# Patient Record
Sex: MALE | Race: WHITE | NOT HISPANIC OR LATINO | Employment: FULL TIME | URBAN - METROPOLITAN AREA
[De-identification: names, ages, dates, MRNs, and addresses within clinical notes are randomized per-mention and may not be internally consistent; named-entity substitution may affect disease eponyms.]

---

## 2017-05-16 ENCOUNTER — RECORDS - HEALTHEAST (OUTPATIENT)
Dept: LAB | Facility: CLINIC | Age: 32
End: 2017-05-16

## 2017-05-17 LAB
CHOLEST SERPL-MCNC: 221 MG/DL
FASTING STATUS PATIENT QL REPORTED: ABNORMAL
HDLC SERPL-MCNC: 32 MG/DL
LDLC SERPL CALC-MCNC: 165 MG/DL
TRIGL SERPL-MCNC: 121 MG/DL

## 2017-05-18 LAB — ANA SER QL: 0.1 U

## 2018-09-15 ENCOUNTER — RECORDS - HEALTHEAST (OUTPATIENT)
Dept: ADMINISTRATIVE | Facility: OTHER | Age: 33
End: 2018-09-15

## 2018-09-19 ENCOUNTER — OFFICE VISIT - HEALTHEAST (OUTPATIENT)
Dept: SURGERY | Facility: CLINIC | Age: 33
End: 2018-09-19

## 2018-09-19 DIAGNOSIS — K81.9 CHOLECYSTITIS: ICD-10-CM

## 2018-09-19 ASSESSMENT — MIFFLIN-ST. JEOR: SCORE: 1695.1

## 2018-09-21 ENCOUNTER — COMMUNICATION - HEALTHEAST (OUTPATIENT)
Dept: SURGERY | Facility: CLINIC | Age: 33
End: 2018-09-21

## 2018-09-26 ASSESSMENT — MIFFLIN-ST. JEOR: SCORE: 1695.1

## 2018-09-27 ENCOUNTER — RECORDS - HEALTHEAST (OUTPATIENT)
Dept: ADMINISTRATIVE | Facility: OTHER | Age: 33
End: 2018-09-27

## 2018-10-03 ENCOUNTER — ANESTHESIA - HEALTHEAST (OUTPATIENT)
Dept: SURGERY | Facility: AMBULATORY SURGERY CENTER | Age: 33
End: 2018-10-03

## 2018-10-04 ENCOUNTER — SURGERY - HEALTHEAST (OUTPATIENT)
Dept: SURGERY | Facility: AMBULATORY SURGERY CENTER | Age: 33
End: 2018-10-04

## 2018-10-04 ASSESSMENT — MIFFLIN-ST. JEOR: SCORE: 1695.1

## 2018-10-06 ENCOUNTER — AMBULATORY - HEALTHEAST (OUTPATIENT)
Dept: SURGERY | Facility: CLINIC | Age: 33
End: 2018-10-06

## 2018-10-06 DIAGNOSIS — K81.9 CHOLECYSTITIS: ICD-10-CM

## 2018-10-15 ENCOUNTER — COMMUNICATION - HEALTHEAST (OUTPATIENT)
Dept: SURGERY | Facility: CLINIC | Age: 33
End: 2018-10-15

## 2018-10-18 ENCOUNTER — OFFICE VISIT - HEALTHEAST (OUTPATIENT)
Dept: SURGERY | Facility: CLINIC | Age: 33
End: 2018-10-18

## 2018-10-18 DIAGNOSIS — Z48.89 POSTOPERATIVE VISIT: ICD-10-CM

## 2018-10-18 ASSESSMENT — MIFFLIN-ST. JEOR: SCORE: 1695.1

## 2018-11-01 ENCOUNTER — COMMUNICATION - HEALTHEAST (OUTPATIENT)
Dept: SURGERY | Facility: CLINIC | Age: 33
End: 2018-11-01

## 2019-01-08 ENCOUNTER — RECORDS - HEALTHEAST (OUTPATIENT)
Dept: ADMINISTRATIVE | Facility: OTHER | Age: 34
End: 2019-01-08

## 2020-05-22 ENCOUNTER — RECORDS - HEALTHEAST (OUTPATIENT)
Dept: ADMINISTRATIVE | Facility: OTHER | Age: 35
End: 2020-05-22

## 2020-05-26 ENCOUNTER — COMMUNICATION - HEALTHEAST (OUTPATIENT)
Dept: ADMINISTRATIVE | Facility: HOSPITAL | Age: 35
End: 2020-05-26

## 2020-05-29 ENCOUNTER — RECORDS - HEALTHEAST (OUTPATIENT)
Dept: ADMINISTRATIVE | Facility: OTHER | Age: 35
End: 2020-05-29

## 2020-06-09 ENCOUNTER — COMMUNICATION - HEALTHEAST (OUTPATIENT)
Dept: ADMINISTRATIVE | Facility: HOSPITAL | Age: 35
End: 2020-06-09

## 2020-06-23 ENCOUNTER — OFFICE VISIT - HEALTHEAST (OUTPATIENT)
Dept: ONCOLOGY | Facility: HOSPITAL | Age: 35
End: 2020-06-23

## 2020-06-23 DIAGNOSIS — I26.99 ACUTE PULMONARY EMBOLISM WITHOUT ACUTE COR PULMONALE, UNSPECIFIED PULMONARY EMBOLISM TYPE (H): ICD-10-CM

## 2020-06-29 ENCOUNTER — COMMUNICATION - HEALTHEAST (OUTPATIENT)
Dept: ONCOLOGY | Facility: HOSPITAL | Age: 35
End: 2020-06-29

## 2020-07-01 ENCOUNTER — COMMUNICATION - HEALTHEAST (OUTPATIENT)
Dept: INFUSION THERAPY | Facility: HOSPITAL | Age: 35
End: 2020-07-01

## 2020-07-09 ENCOUNTER — AMBULATORY - HEALTHEAST (OUTPATIENT)
Dept: INFUSION THERAPY | Facility: HOSPITAL | Age: 35
End: 2020-07-09

## 2020-07-09 DIAGNOSIS — I26.99 ACUTE PULMONARY EMBOLISM WITHOUT ACUTE COR PULMONALE, UNSPECIFIED PULMONARY EMBOLISM TYPE (H): ICD-10-CM

## 2020-07-10 LAB
AT III ACT/NOR PPP CHRO: 108 % (ref 85–135)
CARDIOLIPIN IGG SER IA-ACNC: <1.6 GPL-U/ML (ref 0–19.9)
CARDIOLIPIN IGM SER IA-ACNC: 0.5 MPL-U/ML (ref 0–19.9)
PROT C ACT/NOR PPP CHRO: 155 % (ref 70–170)
PROT S FREE AG ACT/NOR PPP IA: 130 % (ref 70–148)

## 2020-07-13 LAB
B2 GLYCOPROT1 IGG SERPL IA-ACNC: 1.3 U/ML
B2 GLYCOPROT1 IGM SERPL IA-ACNC: <2.9 U/ML
FACTOR 5 LEIDEN AND FACTOR 2 PROTHROMBIN MUTATION: NORMAL

## 2020-07-14 LAB — LA PPP-IMP: POSITIVE

## 2020-07-30 ENCOUNTER — AMBULATORY - HEALTHEAST (OUTPATIENT)
Dept: ONCOLOGY | Facility: HOSPITAL | Age: 35
End: 2020-07-30

## 2020-07-30 DIAGNOSIS — I26.99 PULMONARY EMBOLISM ON RIGHT (H): ICD-10-CM

## 2020-07-31 ENCOUNTER — COMMUNICATION - HEALTHEAST (OUTPATIENT)
Dept: ONCOLOGY | Facility: HOSPITAL | Age: 35
End: 2020-07-31

## 2020-08-04 ENCOUNTER — COMMUNICATION - HEALTHEAST (OUTPATIENT)
Dept: ONCOLOGY | Facility: HOSPITAL | Age: 35
End: 2020-08-04

## 2020-11-23 ENCOUNTER — AMBULATORY - HEALTHEAST (OUTPATIENT)
Dept: INFUSION THERAPY | Facility: HOSPITAL | Age: 35
End: 2020-11-23

## 2020-11-23 DIAGNOSIS — I26.99 PULMONARY EMBOLISM ON RIGHT (H): ICD-10-CM

## 2020-11-23 DIAGNOSIS — I26.99 ACUTE PULMONARY EMBOLISM WITHOUT ACUTE COR PULMONALE, UNSPECIFIED PULMONARY EMBOLISM TYPE (H): ICD-10-CM

## 2020-11-23 LAB
ALBUMIN SERPL-MCNC: 3.7 G/DL (ref 3.5–5)
ALP SERPL-CCNC: 107 U/L (ref 45–120)
ALT SERPL W P-5'-P-CCNC: 96 U/L (ref 0–45)
ANION GAP SERPL CALCULATED.3IONS-SCNC: 9 MMOL/L (ref 5–18)
AST SERPL W P-5'-P-CCNC: 36 U/L (ref 0–40)
BASOPHILS # BLD AUTO: 0.1 THOU/UL (ref 0–0.2)
BASOPHILS NFR BLD AUTO: 1 % (ref 0–2)
BILIRUB SERPL-MCNC: 0.2 MG/DL (ref 0–1)
BUN SERPL-MCNC: 13 MG/DL (ref 8–22)
CALCIUM SERPL-MCNC: 9 MG/DL (ref 8.5–10.5)
CHLORIDE BLD-SCNC: 99 MMOL/L (ref 98–107)
CO2 SERPL-SCNC: 32 MMOL/L (ref 22–31)
CREAT SERPL-MCNC: 0.8 MG/DL (ref 0.7–1.3)
EOSINOPHIL # BLD AUTO: 0.2 THOU/UL (ref 0–0.4)
EOSINOPHIL NFR BLD AUTO: 2 % (ref 0–6)
ERYTHROCYTE [DISTWIDTH] IN BLOOD BY AUTOMATED COUNT: 12.9 % (ref 11–14.5)
GFR SERPL CREATININE-BSD FRML MDRD: >60 ML/MIN/1.73M2
GLUCOSE BLD-MCNC: 109 MG/DL (ref 70–125)
HCT VFR BLD AUTO: 47.6 % (ref 40–54)
HGB BLD-MCNC: 15 G/DL (ref 14–18)
IMM GRANULOCYTES # BLD: 0.1 THOU/UL
IMM GRANULOCYTES NFR BLD: 1 %
LYMPHOCYTES # BLD AUTO: 2.4 THOU/UL (ref 0.8–4.4)
LYMPHOCYTES NFR BLD AUTO: 31 % (ref 20–40)
MCH RBC QN AUTO: 30.1 PG (ref 27–34)
MCHC RBC AUTO-ENTMCNC: 31.5 G/DL (ref 32–36)
MCV RBC AUTO: 96 FL (ref 80–100)
MONOCYTES # BLD AUTO: 0.6 THOU/UL (ref 0–0.9)
MONOCYTES NFR BLD AUTO: 8 % (ref 2–10)
NEUTROPHILS # BLD AUTO: 4.7 THOU/UL (ref 2–7.7)
NEUTROPHILS NFR BLD AUTO: 59 % (ref 50–70)
PLATELET # BLD AUTO: 237 THOU/UL (ref 140–440)
PMV BLD AUTO: 10.6 FL (ref 8.5–12.5)
POTASSIUM BLD-SCNC: 5 MMOL/L (ref 3.5–5)
PROT SERPL-MCNC: 7.8 G/DL (ref 6–8)
RBC # BLD AUTO: 4.98 MILL/UL (ref 4.4–6.2)
SODIUM SERPL-SCNC: 140 MMOL/L (ref 136–145)
WBC: 7.9 THOU/UL (ref 4–11)

## 2020-11-24 ENCOUNTER — OFFICE VISIT - HEALTHEAST (OUTPATIENT)
Dept: ONCOLOGY | Facility: HOSPITAL | Age: 35
End: 2020-11-24

## 2020-11-24 DIAGNOSIS — I26.99 PULMONARY EMBOLISM ON RIGHT (H): ICD-10-CM

## 2020-11-25 LAB — LA PPP-IMP: POSITIVE

## 2020-12-01 ENCOUNTER — COMMUNICATION - HEALTHEAST (OUTPATIENT)
Dept: ONCOLOGY | Facility: HOSPITAL | Age: 35
End: 2020-12-01

## 2021-05-31 ENCOUNTER — RECORDS - HEALTHEAST (OUTPATIENT)
Dept: ADMINISTRATIVE | Facility: CLINIC | Age: 36
End: 2021-05-31

## 2021-06-02 VITALS — WEIGHT: 173 LBS | BODY MASS INDEX: 25.62 KG/M2 | HEIGHT: 69 IN

## 2021-06-02 VITALS — BODY MASS INDEX: 25.62 KG/M2 | HEIGHT: 69 IN | WEIGHT: 173 LBS

## 2021-06-02 VITALS — HEIGHT: 69 IN | BODY MASS INDEX: 25.62 KG/M2 | WEIGHT: 173 LBS

## 2021-06-05 ENCOUNTER — HEALTH MAINTENANCE LETTER (OUTPATIENT)
Age: 36
End: 2021-06-05

## 2021-06-09 NOTE — PROGRESS NOTES
"Ivan Nicole is a 35 y.o. male who is being evaluated via a billable telephone visit for new patient consult.    The patient has been notified of following:     \"This telephone visit will be conducted via a call between you and your physician/provider. We have found that certain health care needs can be provided without the need for a physical exam.  This service lets us provide the care you need with a short phone conversation.  If a prescription is necessary we can send it directly to your pharmacy.  If lab work is needed we can place an order for that and you can then stop by our lab to have the test done at a later time.    Telephone visits are billed at different rates depending on your insurance coverage. During this emergency period, for some insurers they may be billed the same as an in-person visit.  Please reach out to your insurance provider with any questions.    If during the course of the call the physician/provider feels a telephone visit is not appropriate, you will not be charged for this service.\"    Patient has given verbal consent to a Telephone visit? Yes    Phone call duration: 10 minutes    Zee Justice RN    "

## 2021-06-09 NOTE — PROGRESS NOTES
[facility] Hematology and Oncology Consult Note    Patient: Ivan Nicole  MRN: [id number]  Date of Service: 06/23/2020         The patient has chosen to have the visit conducted as a telephone visit, to reduce risk of exposure given the current status of Coronavirus in our community. This telephone visit is being conducted via a call between the patient and physician/provider. Health care needs are being provided without a physical exam.     The patient has been notified of following:      We have found that certain health care needs can be provided without the need for a physical exam.  This service lets us provide the care you need with a short phone conversation.  If a prescription is necessary we can send it directly to your pharmacy.  If lab work is needed we can place an order for that and you can then stop by our lab to have the test done at a later time.  If during the course of the call the physician/provider feels a telephone visit is not appropriate, you will not be charged for this service    The patient consents to a telephone visit.     Reason for Visit:    1.  Pulmonary embolism    Assessment/Plan:    1.  Pulmonary embolism: This is a seemingly unprovoked event.  He has been started on rivaroxaban and is tolerating this well.  No bleeding problems.  His pulmonary symptoms have resolved.  I told him that we would typically keep him on anticoagulation for at least 6 months.  I told him I would recommend longer-term anticoagulation after that time.  We will see him back in clinic in 6 months to make a long-term plan.  We will have him come in to clinic this week for a hypercoagulable panel given his young age.  Questions were answered.    Total time on the phone was 25 minutes.    ECOG Performance   ECOG Performance Status: 0    Distress Assessment  Distress Assessment Score: No distress    Problem List:    1. Acute pulmonary embolism without acute cor pulmonale, unspecified pulmonary embolism type  (H)  HM1(CBC and Differential)    Comprehensive Metabolic Panel    Factor 5 Leiden Mutation by PCR    Antithrombin III Activity(ANTCH)    Lupus Anticoagulant Panel (LUPUSB)    Protein C Activity (PCCH)    Factor 2 (Prothrombin) by PCR    Beta-2 Glycoprotein Antibodies,IgG and IgM    Cardiolipin Antibodies, IgG and IgM(CARDGM)    Protein S Antigen, Free     Staging History:    Cancer Staging  No matching staging information was found for the patient.    History:    Ivan is a 35-year-old gentleman who was referred to us for recent pulmonary embolism.  He had symptoms of chest pain which was pleuritic and some shortness of breath for about 3 days prior to his presentation to the emergency room.  At that time he had a CT scan done which showed acute pulmonary emboli in the right lower lobe.  He was started on rivaroxaban.  His symptoms have gradually improved.  They have now basically resolved.  He has not had any problems with rivaroxaban.  Denies any known family history of blood clotting.    Past History:    Past Medical History:   Diagnosis Date     Anxiety      Chiari malformation type I (H)      Community acquired pneumonia of right middle lobe of lung      Depression     Family History   Problem Relation Age of Onset     Snoring Brother      Snoring Brother       [unfilled] Social History     Socioeconomic History     Marital status:      Spouse name: Not on file     Number of children: Not on file     Years of education: Not on file     Highest education level: Not on file   Occupational History     Not on file   Social Needs     Financial resource strain: Not on file     Food insecurity     Worry: Not on file     Inability: Not on file     Transportation needs     Medical: Not on file     Non-medical: Not on file   Tobacco Use     Smoking status: Current Every Day Smoker     Packs/day: 1.00     Smokeless tobacco: Current User   Substance and Sexual Activity     Alcohol use: No     Drug use: Yes     Comment:  in recovery-doesn't want any narcotics/pain medications.     Sexual activity: Never   Lifestyle     Physical activity     Days per week: Not on file     Minutes per session: Not on file     Stress: Not on file   Relationships     Social connections     Talks on phone: Not on file     Gets together: Not on file     Attends Evangelical service: Not on file     Active member of club or organization: Not on file     Attends meetings of clubs or organizations: Not on file     Relationship status: Not on file     Intimate partner violence     Fear of current or ex partner: Not on file     Emotionally abused: Not on file     Physically abused: Not on file     Forced sexual activity: Not on file   Other Topics Concern     Not on file   Social History Narrative     Not on file        Allergies:    No Known Allergies    Review of Systems:    As above in the history.     Review of Systems otherwise Negative for:  General: chills, fever or night sweats  Psychological: anxiety or depression  Ophthalmic: blurry vision, double vision or loss of vision, vision change  ENT: epistaxis, oral lesions, hearing changes  Hematological and Lymphatic: bleeding, bruising, jaundice, swollen lymph nodes  Endocrine: hot flashes, malaise/lethargy or unexpected weight changes  Respiratory: cough, hemoptysis  Cardiovascular: chest pain, edema, palpitations or PND  Gastrointestinal: abdominal pain, blood in stools, change in bowel habits, constipation, diarrhea or nausea/vomiting  Genito-Urinary: change in urinary stream, incontinence, frequency/urgency  Musculoskeletal: joint pain, stiffness, swelling, muscle pain or weakness  Neurological: dizziness, headaches, numbness/tingling  Dermatological: lumps and rash    ECOG performance status is 0    Pain  Currently in Pain: No/denies    Physical Exam:    Recent Vitals 5/25/2020   Height -   Weight -   BSA (m2) -   BP -   Pulse -   Temp -   Temp src -   SpO2 91   Some recent data might be hidden        Lab Results:    No results found for this or any previous visit (from the past 168 hour(s)).    Imaging Results:    EXAM: CTA CHEST PE RUN  LOCATION: [facility]  DATE/TIME: 5/23/2020 6:43 PM     FINDINGS:  ANGIOGRAM CHEST: Acute pulmonary emboli within the segmental and subsegmental branch vessels to the right lower lobe. Suboptimal enhancement of the rest of the pulmonary arteries. Possible tiny emboli to the left lower lobe.     LUNGS AND PLEURA: Elevated right hemidiaphragm. Small right pleural effusion. Airspace consolidation within both lower lobes, right greater than left. Additional infiltrate or atelectasis in the right middle lobe and lingula.     MEDIASTINUM/AXILLAE: No adenopathy. Small hypodense nodule in the thyroid isthmus.     UPPER ABDOMEN: Diffuse fatty infiltration of the liver. Cholecystectomy.     MUSCULOSKELETAL: Normal.     IMPRESSION:   1.  Acute pulmonary emboli right lower lobe.  2.  Small right pleural effusion and atelectasis or infiltrates both lower lobes, right middle lobe, and lingula.  3.  Small hypodense nodule in the thyroid isthmus. This could be further evaluated with ultrasound.  4.  Results discussed with ASHLEY Mazariegos on 05/23/2020 at 6:55 PM.      Signed by: Lj De La Rosa MD

## 2021-06-10 NOTE — TELEPHONE ENCOUNTER
Patient contacted today and reviewed recent lab tests including positive lupus anticoagulation test.   Discussed Dr De La Rosa thoughts that this may be a transient positive and we will recheck in November.  Pt verbalized understanding and will continue his blood thinner and RTC in November as scheduled.

## 2021-06-10 NOTE — TELEPHONE ENCOUNTER
Called and left message for patient to return call to discuss lab results from 7/9/2020. Requested patient call back. Araceli Darling, CMA

## 2021-06-10 NOTE — TELEPHONE ENCOUNTER
----- Message from Araceli Darling CMA sent at 7/31/2020  9:09 AM CDT -----  Regarding: FW: Anticoag labs    ----- Message -----  From: Lj De La Rosa MD  Sent: 7/30/2020   2:38 PM CDT  To: Zee Justice RN, Jackson Aquino  Subject: RE: Anticoag labs                                Everything is normal except a lupus anticoagulant which came back positive.  I will recheck this when we see him in November.  Sometimes these can transiently be positive.    Jackson,: He had a lab appointment he sees me on November 24.  Thanks.    ----- Message -----  From: Zee Justice RN  Sent: 7/28/2020  11:22 AM CDT  To: Lj De La Rosa MD  Subject: Anticoag labs                                    Labs are final from 7/9/20, please review and advise, pt next appt is in November.  Thanks! B

## 2021-06-13 NOTE — PROGRESS NOTES
"Ivan Nicole is a 35 y.o. male who is being evaluated via a billable video visit for pulmonary embolism.      The patient has been notified of following:     \"This video visit will be conducted via a call between you and your physician/provider. We have found that certain health care needs can be provided without the need for an in-person physical exam.  This service lets us provide the care you need with a video conversation.  If a prescription is necessary we can send it directly to your pharmacy.  If lab work is needed we can place an order for that and you can then stop by our lab to have the test done at a later time.    Video visits are billed at different rates depending on your insurance coverage. Please reach out to your insurance provider with any questions.    If during the course of the call the physician/provider feels a video visit is not appropriate, you will not be charged for this service.\"    Patient has given verbal consent to a Video visit? Yes  How would you like to obtain your AVS? AVS Preference: Mail a copy.  If dropped by the video visit, the video invitation should be sent to: Send to e-mail at: [email or web address]  Will anyone else be joining your video visit? No      Zee Justice, NILSA       [facility] Hematology and Oncology Progress Note    Patient: Ivan Nicole  MRN: [id number]  Date of Service: 11/24/2020      Assessment and Plan:    1.  Pulmonary embolism: He ran out of Xarelto about a week ago.  He is just completing 6 months of treatment.  He did have any complications with the Xarelto.  Spent some time talking about his risk of recurrence with and without further treatment.  In general I think the recurrence risk could be 30 to 40% of anticoagulation and in the single digits with anticoagulation.  Bleeding risk would also be quite low on anticoagulation.  Repeat lupus anticoagulant testing is pending.  I told him that I would generally recommend ongoing " anticoagulation given his young age and initial presentation.  I think the benefits outweigh risk.  He agreed and would like to continue.  I refilled his Xarelto prescription.  We will see him again in 6 months.    Total time on the phone was 15 minutes.    ECOG Performance   ECOG Performance Status: 0    Distress Assessment  Distress Assessment Score: No distress    Pain  Currently in Pain: No/denies    Diagnosis:    1.  Right lower lobe pulmonary embolism: Diagnosed May 2020.    Treatment:    Rivaroxaban.    Interim History:    Ivan is contacted by phone today for a follow-up visit.  In general he has been feeling okay.  Stopped Xarelto about a week ago secondary to running out of the medication.  He did not have any significant problems while he is taking Xarelto.  Denies any acute complaints today.    Review of Systems:    Constitutional  Constitutional (WDL): All constitutional elements are within defined limits  Neurosensory  Neurosensory (WDL): All neurosensory elements are within defined limits  Cardiovascular  Cardiovascular (WDL): All cardiovascular elements are within defined limits  Pulmonary  Respiratory (WDL): Within Defined Limits  Gastrointestinal  Gastrointestinal (WDL): All gastrointestinal elements are within defined limits  Genitourinary  Genitourinary (WDL): All genitourinary elements are within defined limits  Integumentary  Integumentary (WDL): All integumentary elements are within defined limits  Patient Coping  Patient Coping: Accepting  Accompanied by  Accompanied by: Alone    Past History:    Past Medical History:   Diagnosis Date     Anxiety      Chiari malformation type I (H)      Community acquired pneumonia of right middle lobe of lung      Depression      Physical Exam:    Recent Vitals 5/25/2020   Height -   Weight -   BSA (m2) -   BP -   Pulse -   Temp -   Temp src -   SpO2 91   Some recent data might be hidden     Lab Results:    Recent Results (from the past 168 hour(s))    Comprehensive Metabolic Panel   Result Value Ref Range    Sodium 140 136 - 145 mmol/L    Potassium 5.0 3.5 - 5.0 mmol/L    Chloride 99 98 - 107 mmol/L    CO2 32 (H) 22 - 31 mmol/L    Anion Gap, Calculation 9 5 - 18 mmol/L    Glucose 109 70 - 125 mg/dL    BUN 13 8 - 22 mg/dL    Creatinine 0.80 0.70 - 1.30 mg/dL    GFR MDRD Af Amer >60 >60 mL/min/1.73m2    GFR MDRD Non Af Amer >60 >60 mL/min/1.73m2    Bilirubin, Total 0.2 0.0 - 1.0 mg/dL    Calcium 9.0 8.5 - 10.5 mg/dL    Protein, Total 7.8 6.0 - 8.0 g/dL    Albumin 3.7 3.5 - 5.0 g/dL    Alkaline Phosphatase 107 45 - 120 U/L    AST 36 0 - 40 U/L    ALT 96 (H) 0 - 45 U/L   HM1 (CBC with Diff)   Result Value Ref Range    WBC 7.9 4.0 - 11.0 thou/uL    RBC 4.98 4.40 - 6.20 mill/uL    Hemoglobin 15.0 14.0 - 18.0 g/dL    Hematocrit 47.6 40.0 - 54.0 %    MCV 96 80 - 100 fL    MCH 30.1 27.0 - 34.0 pg    MCHC 31.5 (L) 32.0 - 36.0 g/dL    RDW 12.9 11.0 - 14.5 %    Platelets 237 140 - 440 thou/uL    MPV 10.6 8.5 - 12.5 fL    Neutrophils % 59 50 - 70 %    Lymphocytes % 31 20 - 40 %    Monocytes % 8 2 - 10 %    Eosinophils % 2 0 - 6 %    Basophils % 1 0 - 2 %    Immature Granulocyte % 1 (H) <=0 %    Neutrophils Absolute 4.7 2.0 - 7.7 thou/uL    Lymphocytes Absolute 2.4 0.8 - 4.4 thou/uL    Monocytes Absolute 0.6 0.0 - 0.9 thou/uL    Eosinophils Absolute 0.2 0.0 - 0.4 thou/uL    Basophils Absolute 0.1 0.0 - 0.2 thou/uL    Immature Granulocyte Absolute 0.1 (H) <=0.0 thou/uL     Imaging:    No results found.      Signed by: Lj De La Rosa MD

## 2021-06-16 PROBLEM — I26.99 PULMONARY EMBOLISM ON RIGHT (H): Status: ACTIVE | Noted: 2020-05-23

## 2021-06-16 PROBLEM — K81.9 CHOLECYSTITIS: Status: ACTIVE | Noted: 2018-09-20

## 2021-06-20 NOTE — PROGRESS NOTES
Patient's wife called because he is still having much pain after surgery.  The patient is on methadone so clearly has a history of pain issues.  Explained to her that he is just can have more pain than the average person.  I did renew the Vicodin but told her to go down to every 6 hours today then every 8 hours tomorrow and then continue to wean him down from there.

## 2021-06-20 NOTE — ANESTHESIA CARE TRANSFER NOTE
Last vitals:   Vitals:    10/04/18 1500   BP: 170/82   Pulse: 67   Resp: 16   Temp:    SpO2: 100%     Patient's level of consciousness is drowsy  Spontaneous respirations: yes  Maintains airway independently: yes  Dentition unchanged: yes  Oropharynx: oropharynx clear of all foreign objects    QCDR Measures:  ASA# 20 - Surgical Safety Checklist: WHO surgical safety checklist completed prior to induction  PQRS# 430 - Adult PONV Prevention: 4558F - Pt received => 2 anti-emetic agents (different classes) preop & intraop  ASA# 8 - Peds PONV Prevention: NA - Not pediatric patient, not GA or 2 or more risk factors NOT present  PQRS# 424 - Lucía-op Temp Management: 4559F - At least one body temp DOCUMENTED => 35.5C or 95.9F within required timeframe  PQRS# 426 - PACU Transfer Protocol: - Transfer of care checklist used  ASA# 14 - Acute Post-op Pain: ASA14B - Patient did NOT experience pain >= 7 out of 10

## 2021-06-20 NOTE — LETTER
Letter by Alejandro Vázquez MBBS at      Author: Alejandro Vázquez MBBS Service: -- Author Type: --    Filed:  Encounter Date: 6/9/2020 Status: (Other)                   Office Hours: Monday - Friday 8:00 - 4:30PM    Ivan Nicole  [address] [address]           June 9, 2020      Dear Ivan:    We received a referral from Dr. Vázquez for you to meet with a hematologist. Unfortunately we have been unable to reach you by phone. If you would like to schedule this please call [phone].       Sincerely,        [facility]

## 2021-06-20 NOTE — PROGRESS NOTES
"I was consulted by [facility] in [facility], Dr. Callum Meléndez, to evaluate this patients gall bladder.    HPI: Ivan Nicole is a 33 y.o. male who has been experiencing some problems with RUQ abdominal and chest pain. he has been noting this for about 8 months. It has been occurring about 2 times per week. It is not associated with nausea or vomiting.      Allergies:Review of patient's allergies indicates no known allergies.    No past medical history on file.    No past surgical history on file.    CURRENT MEDS:    Current Outpatient Prescriptions:      methadone (DOLOPHINE) 10 mg/mL solution, Take 75 mg by mouth daily., Disp: , Rfl:      OMEPRAZOLE ORAL, Take by mouth daily., Disp: , Rfl:      SERTRALINE HCL (ZOLOFT ORAL), Take 100 mg by mouth daily., Disp: , Rfl:      testosterone cypionate (DEPOTESTOTERONE CYPIONATE) 200 mg/mL injection, 12 mg. , Disp: , Rfl:     Family History   Problem Relation Age of Onset     Snoring Brother      Snoring Brother         reports that he has been smoking.  He has never used smokeless tobacco. He reports that he uses illicit drugs. He reports that he does not drink alcohol.    Review of Systems:  10 system were reviewed and all are within normal limits except for those listed in the HPI.      EXAM:  /65  Pulse 71  Ht 5' 9\" (1.753 m)  Wt 173 lb (78.5 kg)  BMI 25.55 kg/m2  GENERAL: Well developed male   EYES: Anicteric Sclera,  EOMI  CARDIAC: RRR w/out murmur  CHEST/LUNG: Clear to ascultation, No wheezes  ABDOMEN: Soft with, +Bowel Sounds  NEURO:No focal deficits, ambulatory  LYMPH: No Axillary or inguinal Adenopathy  EXTREMITIES: Ambulatory, No lower extremity deformities      LABS:  Lab Results   Component Value Date    WBC 10.1 11/20/2017    HGB 14.8 11/20/2017    HCT 43.6 11/20/2017    MCV 93 11/20/2017     11/20/2017     INR/Prothrombin Time      Lab Results   Component Value Date    ALT 17 11/20/2017    AST 14 11/20/2017    ALKPHOS 68 11/20/2017    BILITOT " 0.5 11/20/2017       IMAGES:   I reviewed the US and see the presence of Gall Stones    Assessment/Plan: Pt with signs and symptoms consistent with chronic cholecystitis. I have recommended a cholecystectomy. I discussed with him the plan to do this laparoscopically understanding the possibility of needing to convert to an open operation. I went over some of the risks of surgery including but not limited to bleeding, infection and bile duct injury. I also discussed the outpatient nature of the surgery and the expected recovery time.         Lj Crisostomo MD  [phone]  [facility] Department of Surgery

## 2021-06-20 NOTE — ANESTHESIA POSTPROCEDURE EVALUATION
Patient: Ivan Nicole  CHOLECYSTECTOMY, LAPAROSCOPIC  Anesthesia type: general    Patient location: PACU  Last vitals:   Vitals:    10/04/18 1557   BP: 161/82   Pulse: 65   Resp: 16   Temp: 36.9  C (98.5  F)   SpO2: 95%     Post vital signs: stable  Level of consciousness: awake and responds to simple questions  Post-anesthesia pain: pain controlled  Post-anesthesia nausea and vomiting: no  Pulmonary: unassisted, return to baseline  Cardiovascular: stable and blood pressure at baseline  Hydration: adequate  Anesthetic events: no    QCDR Measures:  ASA# 11 - Lucía-op Cardiac Arrest: ASA11B - Patient did NOT experience unanticipated cardiac arrest  ASA# 12 - Lucía-op Mortality Rate: ASA12B - Patient did NOT die  ASA# 13 - PACU Re-Intubation Rate: ASA13B - Patient did NOT require a new airway mgmt  ASA# 10 - Composite Anes Safety: ASA10A - No serious adverse event    Additional Notes:

## 2021-06-20 NOTE — ANESTHESIA PREPROCEDURE EVALUATION
"Anesthesia Evaluation      Patient summary reviewed   No history of anesthetic complications     Airway   Mallampati: II  Neck ROM: full   Pulmonary - normal exam   (+) a smoker                         Cardiovascular - normal exam  Exercise tolerance: > or = 4 METS  (+) , hypercholesterolemia,      Neuro/Psych    (+) depression, anxiety/panic attacks,   (-) no chronic pain    Comments: Hx chemical dependency. Currently managed with methadone.    Marijuana use.    Dizziness treated with meclizine    Endo/Other    (-) no diabetes     Comments: Hypogonadism    GI/Hepatic/Renal    (+) GERD,     (-) impaired hepatic function, renal disease     Other findings:     Chiari malformation Type 1 - headaches.    Hgb 13.6      Dental    (+) chipped                       Anesthesia Plan  Planned anesthetic: general endotracheal  Modified RSI with Zemuron  Zofran/Decadron  Ketamine 35 mg IV after induction  Consider dilaudid IV intraop  Toradol 30 mg IV if \"ok\" with Dr. Crisostomo  ASA 3   Induction: intravenous   Anesthetic plan and risks discussed with: patient and spouse  Anesthesia plan special considerations: rapid sequence induction, antiemetics,   Post-op plan: routine recovery          "

## 2021-06-21 NOTE — PROGRESS NOTES
"HPI: Pt is here for follow up laparoscopic cholecystectomy with Dr. Crisostomo on 10/04/2018.   he is doing well.  Pain is well controlled.  No difficulties with the surgical wound/wounds.  he is eating well and denies fever and chills.         /66 (Patient Site: Right Arm, Patient Position: Sitting, Cuff Size: Adult Regular)  Ht 5' 9\" (1.753 m)  Wt 173 lb (78.5 kg)  BMI 25.55 kg/m2    EXAM:  GENERAL:Appears well  ABDOMEN:  Soft, +BS  SURGICAL WOUNDS:  Incisions healing well with some bruising near umbilical port site that is fading to yellow, no enduration or drainage.       Assessment/Plan: Doing well after surgery and should follow up as needed.    Nay Murrieta , [facility]       "

## 2021-07-03 NOTE — ADDENDUM NOTE
Addendum Note by Marilynn Drummond at 11/23/2020  1:45 PM     Author: Marilynn Drummond Service: -- Author Type:     Filed: 11/26/2020  9:32 AM Encounter Date: 11/23/2020 Status: Signed    : Marilynn Drummond ()    Addended by: MARILYNN DRUMMOND on: 11/26/2020 09:32 AM        Modules accepted: Orders

## 2021-07-03 NOTE — ADDENDUM NOTE
Addendum Note by Marilynn Drummond at 7/9/2020  1:00 PM     Author: Marilynn Drummond Service: -- Author Type:     Filed: 7/14/2020  2:18 PM Encounter Date: 7/9/2020 Status: Signed    : Marilynn Drummond ()    Addended by: MARILYNN DRUMMOND on: 7/14/2020 02:18 PM        Modules accepted: Orders

## 2021-07-05 ENCOUNTER — AMBULATORY - HEALTHEAST (OUTPATIENT)
Dept: ONCOLOGY | Facility: HOSPITAL | Age: 36
End: 2021-07-05

## 2021-07-05 DIAGNOSIS — I26.99 PULMONARY EMBOLISM ON RIGHT (H): ICD-10-CM

## 2021-07-08 ENCOUNTER — COMMUNICATION - HEALTHEAST (OUTPATIENT)
Dept: ADMINISTRATIVE | Facility: HOSPITAL | Age: 36
End: 2021-07-08

## 2021-07-08 NOTE — TELEPHONE ENCOUNTER
Telephone Encounter by Cristine Juan at 7/8/2021 12:21 PM     Author: Cristine Juan Service: -- Author Type: Patient Access    Filed: 7/8/2021 12:22 PM Encounter Date: 7/8/2021 Status: Signed    : Cristine Juan (Patient Access)       Ivan missed his 6 mo follow up Lab/MD OV this morning    Called and left message to call back to r/s missed appts

## 2021-09-25 ENCOUNTER — HEALTH MAINTENANCE LETTER (OUTPATIENT)
Age: 36
End: 2021-09-25

## 2021-10-06 ENCOUNTER — LAB (OUTPATIENT)
Dept: INFUSION THERAPY | Facility: HOSPITAL | Age: 36
End: 2021-10-06
Attending: INTERNAL MEDICINE
Payer: COMMERCIAL

## 2021-10-06 ENCOUNTER — ONCOLOGY VISIT (OUTPATIENT)
Dept: ONCOLOGY | Facility: HOSPITAL | Age: 36
End: 2021-10-06
Attending: INTERNAL MEDICINE
Payer: COMMERCIAL

## 2021-10-06 VITALS
WEIGHT: 232 LBS | DIASTOLIC BLOOD PRESSURE: 81 MMHG | OXYGEN SATURATION: 97 % | HEART RATE: 69 BPM | SYSTOLIC BLOOD PRESSURE: 127 MMHG | RESPIRATION RATE: 12 BRPM | TEMPERATURE: 98.4 F | BODY MASS INDEX: 33.29 KG/M2

## 2021-10-06 DIAGNOSIS — I26.99 PULMONARY EMBOLISM ON RIGHT (H): ICD-10-CM

## 2021-10-06 DIAGNOSIS — I26.99 PULMONARY EMBOLISM ON RIGHT (H): Primary | ICD-10-CM

## 2021-10-06 LAB
ALBUMIN SERPL-MCNC: 3.7 G/DL (ref 3.5–5)
ALP SERPL-CCNC: 97 U/L (ref 45–120)
ALT SERPL W P-5'-P-CCNC: 80 U/L (ref 0–45)
ANION GAP SERPL CALCULATED.3IONS-SCNC: 9 MMOL/L (ref 5–18)
AST SERPL W P-5'-P-CCNC: 36 U/L (ref 0–40)
BASOPHILS # BLD MANUAL: 0.1 10E3/UL (ref 0–0.2)
BASOPHILS NFR BLD MANUAL: 1 %
BILIRUB SERPL-MCNC: 0.4 MG/DL (ref 0–1)
BUN SERPL-MCNC: 12 MG/DL (ref 8–22)
CALCIUM SERPL-MCNC: 9.4 MG/DL (ref 8.5–10.5)
CHLORIDE BLD-SCNC: 102 MMOL/L (ref 98–107)
CO2 SERPL-SCNC: 29 MMOL/L (ref 22–31)
CREAT SERPL-MCNC: 0.77 MG/DL (ref 0.7–1.3)
EOSINOPHIL # BLD MANUAL: 0.1 10E3/UL (ref 0–0.7)
EOSINOPHIL NFR BLD MANUAL: 1 %
ERYTHROCYTE [DISTWIDTH] IN BLOOD BY AUTOMATED COUNT: 13.8 % (ref 10–15)
GFR SERPL CREATININE-BSD FRML MDRD: >90 ML/MIN/1.73M2
GLUCOSE BLD-MCNC: 96 MG/DL (ref 70–125)
HCT VFR BLD AUTO: 43.8 % (ref 40–53)
HGB BLD-MCNC: 14.4 G/DL (ref 13.3–17.7)
LYMPHOCYTES # BLD MANUAL: 2.6 10E3/UL (ref 0.8–5.3)
LYMPHOCYTES NFR BLD MANUAL: 38 %
MCH RBC QN AUTO: 31.3 PG (ref 26.5–33)
MCHC RBC AUTO-ENTMCNC: 32.9 G/DL (ref 31.5–36.5)
MCV RBC AUTO: 95 FL (ref 78–100)
MONOCYTES # BLD MANUAL: 0.3 10E3/UL (ref 0–1.3)
MONOCYTES NFR BLD MANUAL: 5 %
NEUTROPHILS # BLD MANUAL: 3.8 10E3/UL (ref 1.6–8.3)
NEUTROPHILS NFR BLD MANUAL: 55 %
PLAT MORPH BLD: ABNORMAL
PLATELET # BLD AUTO: 197 10E3/UL (ref 150–450)
POTASSIUM BLD-SCNC: 4.6 MMOL/L (ref 3.5–5)
PROT SERPL-MCNC: 7.5 G/DL (ref 6–8)
RBC # BLD AUTO: 4.6 10E6/UL (ref 4.4–5.9)
RBC MORPH BLD: ABNORMAL
SODIUM SERPL-SCNC: 140 MMOL/L (ref 136–145)
VARIANT LYMPHS BLD QL SMEAR: PRESENT
WBC # BLD AUTO: 6.9 10E3/UL (ref 4–11)

## 2021-10-06 PROCEDURE — 85027 COMPLETE CBC AUTOMATED: CPT

## 2021-10-06 PROCEDURE — G0463 HOSPITAL OUTPT CLINIC VISIT: HCPCS

## 2021-10-06 PROCEDURE — 36415 COLL VENOUS BLD VENIPUNCTURE: CPT

## 2021-10-06 PROCEDURE — 99213 OFFICE O/P EST LOW 20 MIN: CPT | Performed by: INTERNAL MEDICINE

## 2021-10-06 PROCEDURE — 80053 COMPREHEN METABOLIC PANEL: CPT

## 2021-10-06 ASSESSMENT — PAIN SCALES - GENERAL: PAINLEVEL: NO PAIN (0)

## 2021-10-06 NOTE — PROGRESS NOTES
"Oncology Rooming Note    October 6, 2021 2:24 PM   Iavn Nicole is a 36 year old male who presents for:    Chief Complaint   Patient presents with     Oncology Clinic Visit     Pulmonary embolism on right      Initial Vitals: /81 (BP Location: Right arm, Patient Position: Sitting, Cuff Size: Adult Regular)   Pulse 69   Temp 98.4  F (36.9  C) (Oral)   Resp 12   Wt 105.2 kg (232 lb)   SpO2 97%   BMI 33.29 kg/m   Estimated body mass index is 33.29 kg/m  as calculated from the following:    Height as of 5/23/20: 1.778 m (5' 10\").    Weight as of this encounter: 105.2 kg (232 lb). Body surface area is 2.28 meters squared.  No Pain (0) Comment: Data Unavailable   No LMP for male patient.  Allergies reviewed: Yes  Medications reviewed: Yes    Medications: Medication refills not needed today.  Pharmacy name entered into [facility]:    [id number] - [place], [place] - [address] [facility] [address] [id number] - [place], [place] - [address] [place] [address] [address]    Clinical concerns:  Pulmonary embolism on right         Lindsay Reddy CMA            "

## 2021-10-06 NOTE — LETTER
"    10/6/2021         RE: Ivan Nicole  [address] [address]        Dear Colleague,    Thank you for referring your patient, Ivan Nicole, to the [facility]. Please see a copy of my visit note below.    Oncology Rooming Note    October 6, 2021 2:24 PM   Ivan Nicole is a 36 year old male who presents for:    Chief Complaint   Patient presents with     Oncology Clinic Visit     Pulmonary embolism on right      Initial Vitals: /81 (BP Location: Right arm, Patient Position: Sitting, Cuff Size: Adult Regular)   Pulse 69   Temp 98.4  F (36.9  C) (Oral)   Resp 12   Wt 105.2 kg (232 lb)   SpO2 97%   BMI 33.29 kg/m   Estimated body mass index is 33.29 kg/m  as calculated from the following:    Height as of 5/23/20: 1.778 m (5' 10\").    Weight as of this encounter: 105.2 kg (232 lb). Body surface area is 2.28 meters squared.  No Pain (0) Comment: Data Unavailable   No LMP for male patient.  Allergies reviewed: Yes  Medications reviewed: Yes    Medications: Medication refills not needed today.  Pharmacy name entered into [facility]:    [facility] #[id number] - [place], [place] - [address] [address] [address] [address] & [id number] - [place], [place] - [address]    Clinical concerns:  Pulmonary embolism on right         Lindsay Reddy CMA              [facility] Hematology and Oncology Progress Note    Patient: Ivan Nicole  MRN: [id number]  Date of Service: Oct 6, 2021        Assessment and Plan:    Cancer Staging  No matching staging information was found for the patient.    1.  Pulmonary embolism:  He is on indefinite anticoagulation. Tolerating it well. No bleeding or bruising or other complications. No chest pain, shortness of breath, or lower extremity edema. He will continue on indefinite anticoagulation with Xarelto as previously discussed. We will see him back in a year for " follow-up. I asked him to let us know in the interim if he develops any concerns or questions    ECOG Performance  0    Diagnosis:    1.  Right lower lobe pulmonary embolism: Diagnosed May 2020.     Treatment:    Rivaroxaban    Interim History:    Ivan returns for a follow up visit. He was last seen about 10 months ago. In the interim has been doing okay. Denies chest pain, shortness of breath, palpitations, or lower extremity edema or pain.    Review of Systems:    As above in the history.     Review of Systems otherwise Negative for:  General: chills, fever or night sweats  Psychological: anxiety or depression  Ophthalmic: blurry vision, double vision or loss of vision, vision change  ENT: epistaxis, oral lesions, hearing changes  Hematological and Lymphatic: bleeding, bruising, jaundice, swollen lymph nodes  Endocrine: hot flashes, unexpected weight changes  Respiratory: cough, hemoptysis, orthopnea or shortness of breath/ROUSSEAU  Cardiovascular: chest pain, edema, palpitations or PND  Gastrointestinal: abdominal pain, blood in stools, change in bowel habits, constipation, diarrhea or nausea/vomiting  Genito-Urinary: change in urinary stream, incontinence, frequency/urgency  Musculoskeletal: joint pain, stiffness, swelling, muscle pain  Neurological: dizziness, headaches, numbness/tingling  Dermatological: lumps and rash    Past History:    Past Medical History:   Diagnosis Date     Anxiety      Chiari malformation type I (H)      Community acquired pneumonia of right middle lobe of lung      Depression      Physical Exam:    /81 (BP Location: Right arm, Patient Position: Sitting, Cuff Size: Adult Regular)   Pulse 69   Temp 98.4  F (36.9  C) (Oral)   Resp 12   Wt 105.2 kg (232 lb)   SpO2 97%   BMI 33.29 kg/m      General: patient appears stated age of 36 year old. Nontoxic and in no distress.   HEENT: Head: atraumatic, normocephalic. Sclerae anicteric.  Chest:  Normal respiratory effort. Clear to  auscultation bilaterally.  Cardiac:  No edema. Regular rate and rhythm, no murmur appreciated.  Abdomen: abdomen is non-distended  Extremities: normal tone and muscle bulk.  Skin: no lesions or rash on visible skin. Warm and dry.   CNS: alert and oriented. Grossly non-focal.   Psychiatric: normal mood and affect.     Lab Results:    Recent Results (from the past 168 hour(s))   Comprehensive metabolic panel   Result Value Ref Range    Sodium 140 136 - 145 mmol/L    Potassium 4.6 3.5 - 5.0 mmol/L    Chloride 102 98 - 107 mmol/L    Carbon Dioxide (CO2) 29 22 - 31 mmol/L    Anion Gap 9 5 - 18 mmol/L    Urea Nitrogen 12 8 - 22 mg/dL    Creatinine 0.77 0.70 - 1.30 mg/dL    Calcium 9.4 8.5 - 10.5 mg/dL    Glucose 96 70 - 125 mg/dL    Alkaline Phosphatase 97 45 - 120 U/L    AST 36 0 - 40 U/L    ALT 80 (H) 0 - 45 U/L    Protein Total 7.5 6.0 - 8.0 g/dL    Albumin 3.7 3.5 - 5.0 g/dL    Bilirubin Total 0.4 0.0 - 1.0 mg/dL    GFR Estimate >90 >60 mL/min/1.73m2   CBC with platelets and differential   Result Value Ref Range    WBC Count 6.9 4.0 - 11.0 10e3/uL    RBC Count 4.60 4.40 - 5.90 10e6/uL    Hemoglobin 14.4 13.3 - 17.7 g/dL    Hematocrit 43.8 40.0 - 53.0 %    MCV 95 78 - 100 fL    MCH 31.3 26.5 - 33.0 pg    MCHC 32.9 31.5 - 36.5 g/dL    RDW 13.8 10.0 - 15.0 %    Platelet Count 197 150 - 450 10e3/uL     Imaging:    No results found.      Signed by: Lj De La Rosa MD        Again, thank you for allowing me to participate in the care of your patient.        Sincerely,        Lj De La Rosa MD

## 2021-10-06 NOTE — PROGRESS NOTES
[facility] Hematology and Oncology Progress Note    Patient: Ivan Nicole  MRN: [id number]  Date of Service: Oct 6, 2021        Assessment and Plan:    Cancer Staging  No matching staging information was found for the patient.    1.  Pulmonary embolism:  He is on indefinite anticoagulation. Tolerating it well. No bleeding or bruising or other complications. No chest pain, shortness of breath, or lower extremity edema. He will continue on indefinite anticoagulation with Xarelto as previously discussed. We will see him back in a year for follow-up. I asked him to let us know in the interim if he develops any concerns or questions    ECOG Performance  0    Diagnosis:    1.  Right lower lobe pulmonary embolism: Diagnosed May 2020.     Treatment:    Rivaroxaban    Interim History:    Ivan returns for a follow up visit. He was last seen about 10 months ago. In the interim has been doing okay. Denies chest pain, shortness of breath, palpitations, or lower extremity edema or pain.    Review of Systems:    As above in the history.     Review of Systems otherwise Negative for:  General: chills, fever or night sweats  Psychological: anxiety or depression  Ophthalmic: blurry vision, double vision or loss of vision, vision change  ENT: epistaxis, oral lesions, hearing changes  Hematological and Lymphatic: bleeding, bruising, jaundice, swollen lymph nodes  Endocrine: hot flashes, unexpected weight changes  Respiratory: cough, hemoptysis, orthopnea or shortness of breath/ROUSSEAU  Cardiovascular: chest pain, edema, palpitations or PND  Gastrointestinal: abdominal pain, blood in stools, change in bowel habits, constipation, diarrhea or nausea/vomiting  Genito-Urinary: change in urinary stream, incontinence, frequency/urgency  Musculoskeletal: joint pain, stiffness, swelling, muscle pain  Neurological: dizziness, headaches, numbness/tingling  Dermatological: lumps and rash    Past History:    Past Medical History:   Diagnosis  Date     Anxiety      Chiari malformation type I (H)      Community acquired pneumonia of right middle lobe of lung      Depression      Physical Exam:    /81 (BP Location: Right arm, Patient Position: Sitting, Cuff Size: Adult Regular)   Pulse 69   Temp 98.4  F (36.9  C) (Oral)   Resp 12   Wt 105.2 kg (232 lb)   SpO2 97%   BMI 33.29 kg/m      General: patient appears stated age of 36 year old. Nontoxic and in no distress.   HEENT: Head: atraumatic, normocephalic. Sclerae anicteric.  Chest:  Normal respiratory effort. Clear to auscultation bilaterally.  Cardiac:  No edema. Regular rate and rhythm, no murmur appreciated.  Abdomen: abdomen is non-distended  Extremities: normal tone and muscle bulk.  Skin: no lesions or rash on visible skin. Warm and dry.   CNS: alert and oriented. Grossly non-focal.   Psychiatric: normal mood and affect.     Lab Results:    Recent Results (from the past 168 hour(s))   Comprehensive metabolic panel   Result Value Ref Range    Sodium 140 136 - 145 mmol/L    Potassium 4.6 3.5 - 5.0 mmol/L    Chloride 102 98 - 107 mmol/L    Carbon Dioxide (CO2) 29 22 - 31 mmol/L    Anion Gap 9 5 - 18 mmol/L    Urea Nitrogen 12 8 - 22 mg/dL    Creatinine 0.77 0.70 - 1.30 mg/dL    Calcium 9.4 8.5 - 10.5 mg/dL    Glucose 96 70 - 125 mg/dL    Alkaline Phosphatase 97 45 - 120 U/L    AST 36 0 - 40 U/L    ALT 80 (H) 0 - 45 U/L    Protein Total 7.5 6.0 - 8.0 g/dL    Albumin 3.7 3.5 - 5.0 g/dL    Bilirubin Total 0.4 0.0 - 1.0 mg/dL    GFR Estimate >90 >60 mL/min/1.73m2   CBC with platelets and differential   Result Value Ref Range    WBC Count 6.9 4.0 - 11.0 10e3/uL    RBC Count 4.60 4.40 - 5.90 10e6/uL    Hemoglobin 14.4 13.3 - 17.7 g/dL    Hematocrit 43.8 40.0 - 53.0 %    MCV 95 78 - 100 fL    MCH 31.3 26.5 - 33.0 pg    MCHC 32.9 31.5 - 36.5 g/dL    RDW 13.8 10.0 - 15.0 %    Platelet Count 197 150 - 450 10e3/uL     Imaging:    No results found.      Signed by: Lj De La Rosa MD

## 2022-01-11 ENCOUNTER — LAB REQUISITION (OUTPATIENT)
Dept: LAB | Facility: CLINIC | Age: 37
End: 2022-01-11
Payer: COMMERCIAL

## 2022-01-11 DIAGNOSIS — R50.9 FEVER, UNSPECIFIED: ICD-10-CM

## 2022-01-11 PROCEDURE — U0003 INFECTIOUS AGENT DETECTION BY NUCLEIC ACID (DNA OR RNA); SEVERE ACUTE RESPIRATORY SYNDROME CORONAVIRUS 2 (SARS-COV-2) (CORONAVIRUS DISEASE [COVID-19]), AMPLIFIED PROBE TECHNIQUE, MAKING USE OF HIGH THROUGHPUT TECHNOLOGIES AS DESCRIBED BY CMS-2020-01-R: HCPCS | Mod: ORL | Performed by: PHYSICIAN ASSISTANT

## 2022-01-12 LAB — SARS-COV-2 RNA RESP QL NAA+PROBE: POSITIVE

## 2022-07-02 ENCOUNTER — HEALTH MAINTENANCE LETTER (OUTPATIENT)
Age: 37
End: 2022-07-02

## 2022-11-03 ENCOUNTER — TELEPHONE (OUTPATIENT)
Dept: ONCOLOGY | Facility: HOSPITAL | Age: 37
End: 2022-11-03

## 2023-04-22 ENCOUNTER — HEALTH MAINTENANCE LETTER (OUTPATIENT)
Age: 38
End: 2023-04-22

## 2023-07-15 ENCOUNTER — HEALTH MAINTENANCE LETTER (OUTPATIENT)
Age: 38
End: 2023-07-15

## 2023-08-07 ENCOUNTER — LAB REQUISITION (OUTPATIENT)
Dept: LAB | Facility: CLINIC | Age: 38
End: 2023-08-07

## 2023-08-07 DIAGNOSIS — Z13.220 ENCOUNTER FOR SCREENING FOR LIPOID DISORDERS: ICD-10-CM

## 2023-08-07 DIAGNOSIS — Z86.711 PERSONAL HISTORY OF PULMONARY EMBOLISM: ICD-10-CM

## 2023-08-07 DIAGNOSIS — Z13.1 ENCOUNTER FOR SCREENING FOR DIABETES MELLITUS: ICD-10-CM

## 2023-08-07 LAB
ANION GAP SERPL CALCULATED.3IONS-SCNC: 7 MMOL/L (ref 7–15)
BUN SERPL-MCNC: 9.9 MG/DL (ref 6–20)
CALCIUM SERPL-MCNC: 9.4 MG/DL (ref 8.6–10)
CHLORIDE SERPL-SCNC: 102 MMOL/L (ref 98–107)
CHOLEST SERPL-MCNC: 225 MG/DL
CREAT SERPL-MCNC: 0.7 MG/DL (ref 0.67–1.17)
DEPRECATED HCO3 PLAS-SCNC: 30 MMOL/L (ref 22–29)
ERYTHROCYTE [DISTWIDTH] IN BLOOD BY AUTOMATED COUNT: 13.4 % (ref 10–15)
GFR SERPL CREATININE-BSD FRML MDRD: >90 ML/MIN/1.73M2
GLUCOSE SERPL-MCNC: 96 MG/DL (ref 70–99)
HCT VFR BLD AUTO: 46.5 % (ref 40–53)
HDLC SERPL-MCNC: 30 MG/DL
HGB BLD-MCNC: 15.3 G/DL (ref 13.3–17.7)
LDLC SERPL CALC-MCNC: 150 MG/DL
MCH RBC QN AUTO: 30.8 PG (ref 26.5–33)
MCHC RBC AUTO-ENTMCNC: 32.9 G/DL (ref 31.5–36.5)
MCV RBC AUTO: 94 FL (ref 78–100)
NONHDLC SERPL-MCNC: 195 MG/DL
PLATELET # BLD AUTO: 162 10E3/UL (ref 150–450)
POTASSIUM SERPL-SCNC: 4.1 MMOL/L (ref 3.4–5.3)
RBC # BLD AUTO: 4.97 10E6/UL (ref 4.4–5.9)
SODIUM SERPL-SCNC: 139 MMOL/L (ref 136–145)
TRIGL SERPL-MCNC: 223 MG/DL
WBC # BLD AUTO: 7 10E3/UL (ref 4–11)

## 2023-08-07 PROCEDURE — 80061 LIPID PANEL: CPT | Performed by: PHYSICIAN ASSISTANT

## 2023-08-07 PROCEDURE — 82310 ASSAY OF CALCIUM: CPT | Performed by: PHYSICIAN ASSISTANT

## 2023-08-07 PROCEDURE — 85027 COMPLETE CBC AUTOMATED: CPT | Performed by: PHYSICIAN ASSISTANT

## 2023-09-02 ENCOUNTER — HOSPITAL ENCOUNTER (OUTPATIENT)
Facility: HOSPITAL | Age: 38
Setting detail: OBSERVATION
Discharge: HOME | End: 2023-09-04
Attending: EMERGENCY MEDICINE | Admitting: UROLOGY
Payer: COMMERCIAL

## 2023-09-02 DIAGNOSIS — N49.2 CELLULITIS OF SCROTUM: ICD-10-CM

## 2023-09-02 DIAGNOSIS — N49.2 SCROTUM, ABSCESS: ICD-10-CM

## 2023-09-02 LAB
APTT PPP: 29 SECONDS (ref 22–38)
CRP SERPL-MCNC: 103 MG/L
INR PPP: 1.08 (ref 0.85–1.15)

## 2023-09-02 PROCEDURE — 85730 THROMBOPLASTIN TIME PARTIAL: CPT | Performed by: EMERGENCY MEDICINE

## 2023-09-02 PROCEDURE — 36415 COLL VENOUS BLD VENIPUNCTURE: CPT | Performed by: EMERGENCY MEDICINE

## 2023-09-02 PROCEDURE — 86140 C-REACTIVE PROTEIN: CPT | Performed by: EMERGENCY MEDICINE

## 2023-09-02 PROCEDURE — 55100 DRAINAGE OF SCROTUM ABSCESS: CPT

## 2023-09-02 PROCEDURE — 250N000013 HC RX MED GY IP 250 OP 250 PS 637: Performed by: STUDENT IN AN ORGANIZED HEALTH CARE EDUCATION/TRAINING PROGRAM

## 2023-09-02 PROCEDURE — 99285 EMERGENCY DEPT VISIT HI MDM: CPT | Mod: 25

## 2023-09-02 PROCEDURE — 99223 1ST HOSP IP/OBS HIGH 75: CPT | Performed by: STUDENT IN AN ORGANIZED HEALTH CARE EDUCATION/TRAINING PROGRAM

## 2023-09-02 PROCEDURE — 85610 PROTHROMBIN TIME: CPT | Performed by: EMERGENCY MEDICINE

## 2023-09-02 PROCEDURE — 250N000011 HC RX IP 250 OP 636: Mod: JZ | Performed by: EMERGENCY MEDICINE

## 2023-09-02 PROCEDURE — 87077 CULTURE AEROBIC IDENTIFY: CPT | Performed by: PHYSICIAN ASSISTANT

## 2023-09-02 PROCEDURE — 96365 THER/PROPH/DIAG IV INF INIT: CPT

## 2023-09-02 PROCEDURE — 87075 CULTR BACTERIA EXCEPT BLOOD: CPT | Performed by: PHYSICIAN ASSISTANT

## 2023-09-02 PROCEDURE — G0378 HOSPITAL OBSERVATION PER HR: HCPCS

## 2023-09-02 PROCEDURE — 87070 CULTURE OTHR SPECIMN AEROBIC: CPT | Performed by: PHYSICIAN ASSISTANT

## 2023-09-02 RX ORDER — OXYCODONE HYDROCHLORIDE 5 MG/1
10 TABLET ORAL EVERY 4 HOURS PRN
Status: DISCONTINUED | OUTPATIENT
Start: 2023-09-02 | End: 2023-09-04 | Stop reason: HOSPADM

## 2023-09-02 RX ORDER — NALOXONE HYDROCHLORIDE 0.4 MG/ML
0.4 INJECTION, SOLUTION INTRAMUSCULAR; INTRAVENOUS; SUBCUTANEOUS
Status: DISCONTINUED | OUTPATIENT
Start: 2023-09-02 | End: 2023-09-04 | Stop reason: HOSPADM

## 2023-09-02 RX ORDER — NALOXONE HYDROCHLORIDE 0.4 MG/ML
0.2 INJECTION, SOLUTION INTRAMUSCULAR; INTRAVENOUS; SUBCUTANEOUS
Status: DISCONTINUED | OUTPATIENT
Start: 2023-09-02 | End: 2023-09-04 | Stop reason: HOSPADM

## 2023-09-02 RX ORDER — ACETAMINOPHEN 325 MG/1
325-650 TABLET ORAL EVERY 6 HOURS PRN
Status: ON HOLD | COMMUNITY
End: 2023-09-04

## 2023-09-02 RX ORDER — PIPERACILLIN SODIUM, TAZOBACTAM SODIUM 3; .375 G/15ML; G/15ML
3.38 INJECTION, POWDER, LYOPHILIZED, FOR SOLUTION INTRAVENOUS EVERY 8 HOURS
Status: DISCONTINUED | OUTPATIENT
Start: 2023-09-03 | End: 2023-09-04 | Stop reason: HOSPADM

## 2023-09-02 RX ORDER — OMEPRAZOLE 40 MG/1
40 CAPSULE, DELAYED RELEASE ORAL 2 TIMES DAILY
COMMUNITY
Start: 2023-08-17

## 2023-09-02 RX ORDER — ONDANSETRON 4 MG/1
4 TABLET, ORALLY DISINTEGRATING ORAL EVERY 6 HOURS PRN
Status: DISCONTINUED | OUTPATIENT
Start: 2023-09-02 | End: 2023-09-04 | Stop reason: HOSPADM

## 2023-09-02 RX ORDER — PROCHLORPERAZINE MALEATE 10 MG
10 TABLET ORAL EVERY 6 HOURS PRN
Status: DISCONTINUED | OUTPATIENT
Start: 2023-09-02 | End: 2023-09-04 | Stop reason: HOSPADM

## 2023-09-02 RX ORDER — PROCHLORPERAZINE 25 MG
25 SUPPOSITORY, RECTAL RECTAL EVERY 12 HOURS PRN
Status: DISCONTINUED | OUTPATIENT
Start: 2023-09-02 | End: 2023-09-04 | Stop reason: HOSPADM

## 2023-09-02 RX ORDER — OXYCODONE HYDROCHLORIDE 5 MG/1
5 TABLET ORAL EVERY 4 HOURS PRN
Status: DISCONTINUED | OUTPATIENT
Start: 2023-09-02 | End: 2023-09-04 | Stop reason: HOSPADM

## 2023-09-02 RX ORDER — ACETAMINOPHEN 325 MG/1
650 TABLET ORAL EVERY 6 HOURS PRN
Status: DISCONTINUED | OUTPATIENT
Start: 2023-09-02 | End: 2023-09-04 | Stop reason: HOSPADM

## 2023-09-02 RX ORDER — ONDANSETRON 2 MG/ML
4 INJECTION INTRAMUSCULAR; INTRAVENOUS EVERY 6 HOURS PRN
Status: DISCONTINUED | OUTPATIENT
Start: 2023-09-02 | End: 2023-09-04 | Stop reason: HOSPADM

## 2023-09-02 RX ORDER — METHADONE HYDROCHLORIDE 10 MG/ML
120 CONCENTRATE ORAL DAILY
Status: DISCONTINUED | OUTPATIENT
Start: 2023-09-03 | End: 2023-09-04 | Stop reason: HOSPADM

## 2023-09-02 RX ORDER — PANTOPRAZOLE SODIUM 40 MG/1
40 TABLET, DELAYED RELEASE ORAL
Status: DISCONTINUED | OUTPATIENT
Start: 2023-09-02 | End: 2023-09-04 | Stop reason: HOSPADM

## 2023-09-02 RX ORDER — PIPERACILLIN SODIUM, TAZOBACTAM SODIUM 3; .375 G/15ML; G/15ML
3.38 INJECTION, POWDER, LYOPHILIZED, FOR SOLUTION INTRAVENOUS ONCE
Status: COMPLETED | OUTPATIENT
Start: 2023-09-02 | End: 2023-09-02

## 2023-09-02 RX ORDER — ACETAMINOPHEN 650 MG/1
650 SUPPOSITORY RECTAL EVERY 6 HOURS PRN
Status: DISCONTINUED | OUTPATIENT
Start: 2023-09-02 | End: 2023-09-04 | Stop reason: HOSPADM

## 2023-09-02 RX ADMIN — SERTRALINE HYDROCHLORIDE 150 MG: 100 TABLET ORAL at 22:57

## 2023-09-02 RX ADMIN — PANTOPRAZOLE SODIUM 40 MG: 40 TABLET, DELAYED RELEASE ORAL at 22:57

## 2023-09-02 RX ADMIN — PIPERACILLIN AND TAZOBACTAM 3.38 G: 3; .375 INJECTION, POWDER, LYOPHILIZED, FOR SOLUTION INTRAVENOUS at 20:48

## 2023-09-02 ASSESSMENT — ACTIVITIES OF DAILY LIVING (ADL)
ADLS_ACUITY_SCORE: 35
ADLS_ACUITY_SCORE: 33
ADLS_ACUITY_SCORE: 35

## 2023-09-02 NOTE — ED NOTES
Expected Patient Referral to ED  5:39 PM    Referring Clinic/Provider:  [facility]    Reason for referral/Clinical facts:  Patient presented with left scrotal swelling.  Exam reported not to be suspicious for Antonia's gangrene.  Ultrasound negative for torsion.  CT demonstrated 4 x 2 x 4 cm scrotal abscess and is being referred for this    Recommendations provided:  Send to ED for further evaluation    Caller was informed that this institution does possess the capabilities and/or resources to provide for patient and should be transferred to our facility.    Discussed that if direct admit is sought and any hurdles are encountered, this ED would be happy to see the patient and evaluate.    Informed caller that recommendations provided are recommendations based only on the facts provided and that they responsible to accept or reject the advice, or to seek a formal in person consultation as needed and that this ED will see/treat patient should they arrive.      Venu Schwartz DO  [facility] EMERGENCY DEPARTMENT  [address] [place] [id number]  [phone]       Venu Schwartz DO  09/02/23 1740

## 2023-09-02 NOTE — ED PROVIDER NOTES
Emergency Department Encounter     Evaluation Date & Time:   2023  6:23 PM    CHIEF COMPLAINT:  Wound Infection      Triage Note:Pt with draining abscess on Lt side of scrotum, referred from [facility], has IV in Rt arm.      Impression and Plan       FINAL IMPRESSION:    ICD-10-CM    1. Cellulitis of scrotum  N49.2       2. Scrotum, abscess  N49.2           ED COURSE & MEDICAL DECISION MAKIN:15 PM I met with the patient, obtained history, performed an initial exam, and discussed options and plan for diagnostics and treatment here in the ED.   7:23 PM I paged for Urology.  7:37 PM I spoke with [facility].   8:07 PM I spoke with [facility].   8:12 PM I paged for the hospitalist.   8:14 PM I rechecked and updated the patient on lab results and Urology recommendations. He is in agreement with plan for admission.   9:25 PM I spoke with Dr. Morales, hospitalist, who accepts the patient for admission.        38 year old male, history of PE anticoagulated on Xarelto, who presents from the [facility] for evaluation of scrotal pain, swelling and erythema that has been worsening since onset 4 days ago. No associated abdominal pain, N/V, urinary difficulties, fevers or chills.     He had scrotal ultrasound at the UR that demonstrated lateral to the left testicle within the scrotum there is a complex heterogenous region of echogenicity with no significant internal flow on color Doppler imaging. This measures 3.3 x 1.8 x 4.4 cm. This is indeterminate, but imaging characteristics can be seen with a hematoma.     He also had CT abdomen / pelvis that demonstrated a 4.7 x 2.6 x 3.9 cm circumscribed low-attenuation structure in the left upper scrotal wall that likely represents an abscess. There is diffuse skin thickening and soft tissue swelling of the scrotum. No subcutaneous air.     Just prior to leaving the UR, the area spontaneously opened draining purulent fluid. On exam here, there is erythema to  the scrotum BL with associated swelling left scrotum and tenderness to palpation. There is a small open area that is draining bloody fluid with palpation. No palpable crepitance. Abdomen is benign.    Labs at the UR remarkable for mild leukocytosis (WBC 12.1).     CRP checked here elevated to 103. Coags WNL.    Urology consulted and agree with IV antibiotics - patient given IV Zosyn. The urologist will present to the ED and perform a bedside I&D and would like the patient admitted thereafter for monitoring and IV antibiotics.    Patient admitted to Hospitalist Service.  Patient stable throughout ED course.      At the conclusion of the encounter I discussed the results of all the tests and the disposition. The questions were answered. The patient and family acknowledged understanding and were agreeable with the care plan.      Medical Decision Making    History:  Supplemental history from: N/A  External Record(s) reviewed: Outpatient Record    Work Up:  Chart documentation includes differential considered and any EKGs or imaging independently interpreted by provider, where specified.  In additional to work up documented, I considered the following work up: Documented in chart, if applicable.    External consultation:  Discussion of management with another provider: Hospitalist and Urology    Complicating factors:  Care impacted by chronic illness: Anticoagulated State  Care affected by social determinants of health: N/A    Disposition considerations: Admit.       MEDICATIONS GIVEN IN THE EMERGENCY DEPARTMENT:  Medications   melatonin tablet 1 mg (has no administration in time range)   ondansetron (ZOFRAN ODT) ODT tab 4 mg (has no administration in time range)     Or   ondansetron (ZOFRAN) injection 4 mg (has no administration in time range)   acetaminophen (TYLENOL) tablet 650 mg (has no administration in time range)     Or   acetaminophen (TYLENOL) Suppository 650 mg (has no administration in time range)    oxyCODONE (ROXICODONE) tablet 5 mg (has no administration in time range)   oxyCODONE (ROXICODONE) tablet 10 mg (has no administration in time range)   prochlorperazine (COMPAZINE) injection 10 mg (has no administration in time range)     Or   prochlorperazine (COMPAZINE) tablet 10 mg (has no administration in time range)     Or   prochlorperazine (COMPAZINE) suppository 25 mg (has no administration in time range)   piperacillin-tazobactam (ZOSYN) 3.375 g vial to attach to  mL bag (has no administration in time range)   rivaroxaban ANTICOAGULANT (XARELTO) tablet 20 mg (has no administration in time range)   methadone (DOLOPHINE-INTENSOL) 10 MG/ML (HIGH CONC) solution 120 mg (has no administration in time range)   piperacillin-tazobactam (ZOSYN) 3.375 g vial to attach to  mL bag (3.375 g Intravenous $New Bag 9/2/23 2048)       NEW PRESCRIPTIONS STARTED AT TODAY'S ED VISIT:  New Prescriptions    No medications on file       HPI     HPI     Ivan Nicole is a 38 year old male, history of PE anticoagulated on Xarelto and pilonidal cyst, who presents to this ED via private vehicle with spouse from the [facility] for evaluation of scrotal pain and swelling.    Per chart review, patient initially presented to [facility] on 9/2/2023 for evaluation of a one year history of intermittent left sided scrotal abscesses with clear discharge. He developed progressively worsening left sided scrotal swelling with malodorous discharge on 8/30/2023 (3 days ago), prompting his [facility] presentation. Patient vitally stable and afebrile on arrival. Exam was notable for significant swelling on the left side of the scrotum with what appears to be a fluctuant abscess with diffuse swelling across the scrotum. Labs notable for WBC 12.1. CT A/P showed 4.7 x 2.6 x 3.9 cm circumscribed low-attenuation structure in the left upper scrotal wall, likely representing an abscess, with diffuse skin thickening  and soft tissue swelling of the scrotum. Scrotal US demonstrated a possible hematoma within the lateral aspect of the left scrotum and tiny left hydrocele. Following US examination patient had spontaneous rupture of the abscess and purulent drainage was manually expressed from the opening. Patient was subsequently referred to the ED for further evaluation and management.     Patient reports a four day history of left sided scrotal pain, swelling and erythema that has been progressively worsening since onset. He notes this has occurred previously. No associated abdominal pain, fevers / chills. Denies difficulties urinating.     Patient has otherwise been in his usual state of health and denies chest pain, shortness of breath, N/V/D, cough or other concerns.    REVIEW OF SYSTEMS:  All other systems reviewed and are negative.      Medical History     Past Medical History:   Diagnosis Date    Anxiety     Chiari malformation type I (H)     Community acquired pneumonia of right middle lobe of lung     Depression        Past Surgical History:   Procedure Laterality Date    LAPAROSCOPIC CHOLECYSTECTOMY N/A 10/4/2018    Procedure: CHOLECYSTECTOMY, LAPAROSCOPIC;  Surgeon: Lj Crisostomo MD;  [facility];  Service:     TONSILLECTOMY         Family History   Problem Relation Age of Onset    Snoring Brother     Snoring Brother        Social History     Tobacco Use    Smoking status: Every Day     Packs/day: 1.00     Types: Cigarettes    Smokeless tobacco: Current   Substance Use Topics    Alcohol use: No    Drug use: Yes     Comment: 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       acetaminophen (TYLENOL) 325 MG tablet  methadone (DOLOPHINE) 10 mg/mL solution  omeprazole (PRILOSEC) 40 MG DR capsule  rivaroxaban ANTICOAGULANT (XARELTO) 20 MG TABS tablet  sertraline (ZOLOFT) 100 MG tablet        Physical Exam     First Vitals:  Patient Vitals for the past 24 hrs:   BP Temp Temp src Pulse  "Resp SpO2 Height Weight   09/02/23 1819 135/79 98.1  F (36.7  C) Temporal 86 16 97 % 1.753 m (5' 9\") 102.1 kg (225 lb)       PHYSICAL EXAM:   Physical Exam    GENERAL: Awake, alert.  In no acute distress.   HEENT: Normocephalic, atraumatic.   NECK: No stridor.  PULMONARY: Symmetrical breath sounds without distress.  Lungs clear to auscultation bilaterally without wheezes, rhonchi or rales.  CARDIO: Regular rate and rhythm.  No significant murmur, rub or gallop.    ABDOMINAL: Abdomen soft, non-distended and non-tender to palpation.    :  There is erythema to the scrotum BL with associated swelling left scrotum and tenderness to palpation. There is a small open area that is draining bloody fluid with palpation. No palpable crepitance.   EXTREMITIES: No lower extremity swelling or edema.      NEURO: Alert and oriented to person, place and time.  Cranial nerves grossly intact.  No focal motor deficit.  PSYCH: Normal mood and affect.  SKIN: Scrotal erythema, as noted above.     Results     LAB:  All pertinent labs reviewed and interpreted  Labs Ordered and Resulted from Time of ED Arrival to Time of ED Departure   CRP INFLAMMATION - Abnormal       Result Value    CRP Inflammation 103.00 (*)    INR - Normal    INR 1.08     PARTIAL THROMBOPLASTIN TIME - Normal    aPTT 29     ANAEROBIC BACTERIAL CULTURE ROUTINE   AEROBIC BACTERIAL CULTURE ROUTINE       RADIOLOGY:    US SCROTUM WITH DUPLEX   LOCATION: [facility]   DATE: 9/2/2023     FINDINGS:     RIGHT: Right testicle measures 5.0 x 2.5 x 2.2 cm. Normal testicle with no masses. Normal arterial duplex and normal color flow. Normal epididymis. No hydrocele. No varicocele.     LEFT: Left testicle measures 4.8 x 2.7 x 2.7 cm. Normal. No intratesticular masses. Normal arterial duplex and normal color flow. Normal epididymis. Tiny hydrocele. No varicocele. Lateral to the left testicle within the scrotum there is a complex heterogenous region of echogenicity " with no significant internal flow on color Doppler imaging. This measures 3.3 x 1.8 x 4.4 cm. This is indeterminate, but imaging characteristics can be seen with a hematoma. Clinical and ultrasound follow-up is recommended.     IMPRESSION:   1. Possible hematoma within the lateral aspect of the left scrotum. Short-term clinical and ultrasound follow-up is recommended.       CT ABDOMEN / PELVIS WITH IV CONTRAST ([facility])    IMPRESSION:   1. 4.7 x 2.6 x 3.9 cm circumscribed low-attenuation structure in the left upper scrotal wall which likely represents an abscess. There is diffuse skin thickening and soft tissue swelling of the scrotum. No subcutaneous air.   2.  Indeterminate 4.3 cm splenic lesion. Incidental splenic lesions are often benign. Recommend follow-up MRI in 6 months.   3.  Fatty change of the liver.         I, Lauren Siu, am serving as a scribe to document services personally performed by Gisell Corey MD based on my observation and the provider's statements to me. I, Gisell Corey MD attest that Lauren Siu is acting in a scribe capacity, has observed my performance of the services and has documented them in accordance with my direction.    Gisell Corey MD  Emergency Medicine  [facility] EMERGENCY DEPARTMENT          Gisell Corey MD  09/03/23 5960

## 2023-09-03 LAB
ANION GAP SERPL CALCULATED.3IONS-SCNC: 9 MMOL/L (ref 7–15)
BUN SERPL-MCNC: 10.9 MG/DL (ref 6–20)
CALCIUM SERPL-MCNC: 8.8 MG/DL (ref 8.6–10)
CHLORIDE SERPL-SCNC: 102 MMOL/L (ref 98–107)
CREAT SERPL-MCNC: 0.77 MG/DL (ref 0.67–1.17)
DEPRECATED HCO3 PLAS-SCNC: 28 MMOL/L (ref 22–29)
ERYTHROCYTE [DISTWIDTH] IN BLOOD BY AUTOMATED COUNT: 13.4 % (ref 10–15)
GFR SERPL CREATININE-BSD FRML MDRD: >90 ML/MIN/1.73M2
GLUCOSE SERPL-MCNC: 127 MG/DL (ref 70–99)
HCT VFR BLD AUTO: 42.7 % (ref 40–53)
HGB BLD-MCNC: 13.9 G/DL (ref 13.3–17.7)
MCH RBC QN AUTO: 30.3 PG (ref 26.5–33)
MCHC RBC AUTO-ENTMCNC: 32.6 G/DL (ref 31.5–36.5)
MCV RBC AUTO: 93 FL (ref 78–100)
PLATELET # BLD AUTO: 151 10E3/UL (ref 150–450)
POTASSIUM SERPL-SCNC: 3.8 MMOL/L (ref 3.4–5.3)
RBC # BLD AUTO: 4.59 10E6/UL (ref 4.4–5.9)
SODIUM SERPL-SCNC: 139 MMOL/L (ref 136–145)
WBC # BLD AUTO: 8.2 10E3/UL (ref 4–11)

## 2023-09-03 PROCEDURE — 250N000011 HC RX IP 250 OP 636: Mod: JZ | Performed by: STUDENT IN AN ORGANIZED HEALTH CARE EDUCATION/TRAINING PROGRAM

## 2023-09-03 PROCEDURE — 99232 SBSQ HOSP IP/OBS MODERATE 35: CPT | Performed by: INTERNAL MEDICINE

## 2023-09-03 PROCEDURE — 96376 TX/PRO/DX INJ SAME DRUG ADON: CPT

## 2023-09-03 PROCEDURE — 36415 COLL VENOUS BLD VENIPUNCTURE: CPT | Performed by: STUDENT IN AN ORGANIZED HEALTH CARE EDUCATION/TRAINING PROGRAM

## 2023-09-03 PROCEDURE — 80048 BASIC METABOLIC PNL TOTAL CA: CPT | Performed by: STUDENT IN AN ORGANIZED HEALTH CARE EDUCATION/TRAINING PROGRAM

## 2023-09-03 PROCEDURE — 250N000013 HC RX MED GY IP 250 OP 250 PS 637: Performed by: STUDENT IN AN ORGANIZED HEALTH CARE EDUCATION/TRAINING PROGRAM

## 2023-09-03 PROCEDURE — 85027 COMPLETE CBC AUTOMATED: CPT | Performed by: STUDENT IN AN ORGANIZED HEALTH CARE EDUCATION/TRAINING PROGRAM

## 2023-09-03 PROCEDURE — G0378 HOSPITAL OBSERVATION PER HR: HCPCS

## 2023-09-03 RX ADMIN — RIVAROXABAN 20 MG: 10 TABLET, FILM COATED ORAL at 00:53

## 2023-09-03 RX ADMIN — PANTOPRAZOLE SODIUM 40 MG: 40 TABLET, DELAYED RELEASE ORAL at 17:20

## 2023-09-03 RX ADMIN — PIPERACILLIN AND TAZOBACTAM 3.38 G: 3; .375 INJECTION, POWDER, LYOPHILIZED, FOR SOLUTION INTRAVENOUS at 12:30

## 2023-09-03 RX ADMIN — METHADONE HYDROCHLORIDE 120 MG: 10 CONCENTRATE ORAL at 08:27

## 2023-09-03 RX ADMIN — PIPERACILLIN AND TAZOBACTAM 3.38 G: 3; .375 INJECTION, POWDER, LYOPHILIZED, FOR SOLUTION INTRAVENOUS at 21:03

## 2023-09-03 RX ADMIN — RIVAROXABAN 20 MG: 10 TABLET, FILM COATED ORAL at 17:19

## 2023-09-03 RX ADMIN — PIPERACILLIN AND TAZOBACTAM 3.38 G: 3; .375 INJECTION, POWDER, LYOPHILIZED, FOR SOLUTION INTRAVENOUS at 04:30

## 2023-09-03 RX ADMIN — PANTOPRAZOLE SODIUM 40 MG: 40 TABLET, DELAYED RELEASE ORAL at 08:25

## 2023-09-03 RX ADMIN — SERTRALINE HYDROCHLORIDE 150 MG: 100 TABLET ORAL at 22:21

## 2023-09-03 ASSESSMENT — ACTIVITIES OF DAILY LIVING (ADL)
ADLS_ACUITY_SCORE: 35

## 2023-09-03 NOTE — PROGRESS NOTES
"[facility]    Medicine Progress Note - Hospitalist Service    Date of Admission:  9/2/2023    Assessment & Plan   38-year-old male with history of chronic pain, PE and tobacco dependence presents with scrotal abscess.      Scrotal abscess:  Underwent bedside I&D 9/2/2023  --Follow-up wound cultures  -- Continue IV Zosyn   -- anticipate discharge in the AM pending further urology input      Chronic pain:  --Continue home methadone      History of PE: Continue home Xarelto      GERD: Continue home PPI      Mood disorder: Continue home Zoloft         Diet: Regular Diet Adult    DVT Prophylaxis: DOAC  Aguilar Catheter: Not present  Lines: None     Cardiac Monitoring: None  Code Status: Full Code      Clinically Significant Risk Factors Present on Admission               # Drug Induced Coagulation Defect: home medication list includes an anticoagulant medication         # Obesity: Estimated body mass index is 33.23 kg/m  as calculated from the following:    Height as of this encounter: 1.753 m (5' 9\").    Weight as of this encounter: 102.1 kg (225 lb).              Disposition Plan      Expected Discharge Date: 09/03/2023                  Perry Saeed DO  Hospitalist Service  [facility]  Securely message with [employer] (more info)  Text page via [employer] Paging/Directory   ______________________________________________________________________    Interval History   NAD. Denies any complaints    Physical Exam   Vital Signs: Temp: 97.9  F (36.6  C) Temp src: Oral BP: 108/58 Pulse: 70   Resp: 18 SpO2: 91 % O2 Device: None (Room air)    Weight: 225 lbs 0 oz  General: NAD  RESPIRATORY: Breathing nonlabored  CARDIOVASCULAR: No le edema bilat.   NEUROLOGIC: Motor intact, speech clear, alert         Medical Decision Making       >45 MINUTES SPENT BY ME on the date of service doing chart review, history, exam, documentation & further activities per the note.      Data       "

## 2023-09-03 NOTE — PLAN OF CARE
Problem: Plan of Care - These are the overarching goals to be used throughout the patient stay.    Goal: Absence of Hospital-Acquired Illness or Injury  Outcome: Progressing  Intervention: Identify and Manage Fall Risk  Recent Flowsheet Documentation  Taken 9/2/2023 2220 by Christiano Mann RN  Safety Promotion/Fall Prevention:   assistive device/personal items within reach   clutter free environment maintained   lighting adjusted   nonskid shoes/slippers when out of bed   patient and family education   safety round/check completed     Problem: Plan of Care - These are the overarching goals to be used throughout the patient stay.    Goal: Optimal Comfort and Wellbeing  Outcome: Progressing     Problem: Skin or Soft Tissue Infection  Goal: Absence of Infection Signs and Symptoms  Outcome: Progressing     Problem: Pain Chronic (Persistent) (Comorbidity Management)  Goal: Acceptable Pain Control and Functional Ability  Outcome: Progressing     Pt alert and oriented. Pt admitted for scrotal abscess, which urology performed and I&D at bedside in the ED. Pt stated his pain is better after the procedure and the scrotum is less swollen. Pt is a stand by assist/independent w/ transfers. Abscess culture pending. Dressing to scrotum changed d/t moderate serosanguinous drainage on previous gauze.

## 2023-09-03 NOTE — PROGRESS NOTES
"  Place of Service:  [facility]     Reason for follow up: Scrotal abscess    SUBJECTIVE:  Events: no acute events overnight    Patient feeling much better this morning. Scrotal pain improved. No bleeding or notable drainage from the scrotum. Afebrile. Tolerating PO.     OBJECTIVE:  PHYSICAL EXAM:  Temp: 97.9  F (36.6  C) Temp src: Oral BP: 108/58 Pulse: 70   Resp: 18 SpO2: 91 % O2 Device: None (Room air)    General: NAD, alert, cooperative  Head: normocephalic, without abnormality / atraumatic  Abdomen: soft, non tender, non distended. No suprapubic fullness, no suprapubic tenderness. No CVA tenderness,   Genitourinary: Circumcised penis without lesions. Left hemiscrotum with skin thickening, erythema, warmth. Small incised area open with 1/4\" iodoform packing in place. No bleeding or notable drainage. No fluctuance or crepitus.   Skin: No rashes or lesions  Musculoskeletal: moves all four extremities equally; no calf edema or tenderness  Psychological: alert and oriented, answers questions appropriately    LABS:  Creatinine   Date Value Ref Range Status   09/03/2023 0.77 0.67 - 1.17 mg/dL Final     WBC Count   Date Value Ref Range Status   09/03/2023 8.2 4.0 - 11.0 10e3/uL Final     Hemoglobin   Date Value Ref Range Status   09/03/2023 13.9 13.3 - 17.7 g/dL Final   ]  Platelet Count   Date Value Ref Range Status   09/03/2023 151 150 - 450 10e3/uL Final       Lab Results: personally reviewed.     ASSESSMENT/PLAN:  Ivan Nicole is being seen by [facility] for a left scrotal abscess.    - S/p bedside I&D by Dr. Kelley last night.   - Wound packed with 1/4\" iodoform gauze. Please change packing BID.   - Afebrile, WBC normal. Patient clinically improving.   - Recommend continued admission for 24 hrs of IV antibiotics, and wound culture results to ensure appropriate abx at discharge. Patient agreeable to staying one more night.    - Urology will continue to follow    Discussed patient with Dr." Fidel Lorenzaan PA-C  [facility]   [phone]

## 2023-09-03 NOTE — MEDICATION SCRIBE - ADMISSION MEDICATION HISTORY
Medication Scribe Admission Medication History    Admission medication history is complete. The information provided in this note is only as accurate as the sources available at the time of the update.    Medication reconciliation/reorder completed by provider prior to medication history? No    Information Source(s): Patient via in-person    Pertinent Information:     Changes made to PTA medication list:  Added: Tylenol 325 mg tablet (per patient)  Deleted: None  Changed: Methadone 80 mg daily to 120 mg daily(per patient)    Medication Affordability:  Not including over the counter (OTC) medications, was there a time in the past 3 months when you did not take your medications as prescribed because of cost?: No    Allergies reviewed with patient and updates made in EHR: yes    Medication History Completed By: Quique Stephen 9/2/2023 9:41 PM    Prior to Admission medications    Medication Sig Last Dose Taking? Auth Provider Long Term End Date   acetaminophen (TYLENOL) 325 MG tablet Take 325-650 mg by mouth every 6 hours as needed for mild pain Unknown at prn Yes Reported, Patient     methadone (DOLOPHINE) 10 mg/mL solution Take 120 mg by mouth daily 9/2/2023 at am Yes Provider, Historical     omeprazole (PRILOSEC) 40 MG DR capsule Take 40 mg by mouth 2 times daily 9/2/2023 at am Yes Reported, Patient No    rivaroxaban ANTICOAGULANT (XARELTO) 20 MG TABS tablet Take 20 mg by mouth daily with food 9/2/2023 at am Yes Reported, Patient No    sertraline (ZOLOFT) 100 MG tablet Take 150 mg by mouth daily Take 150 mg (1.5 tablet) by mouth daily 9/2/2023 at am Yes Provider, Historical

## 2023-09-03 NOTE — H&P
"[facility]    History and Physical - Hospitalist Service       Date of Admission:  9/2/2023    Assessment & Plan      Ivan Nicole is a 38M presents from urgent care for scrotal abscess; pmhx includes pilonidal cyst, PE (indefinite xarelto), chronic pain (methadone maintenance); admitted to observation status for draining scrotal abscess, s/p bedside I&D with urology.    #scrotal abscess  NOT sepsis: SIRS 1/4 (WBC 12.1 at urgent care). Underwent bedside I&D with urology 9/2. Overall improvement in pain.  -CBC trend in AM  -wound cultures pending  -zosyn 3.375g IV q8h, anticipate discharge on augmentin  -oxycodone 5-10mg PO PRN  -methadone 120mg PO every day  -Urology following    #chronic pain  -methadone 120mg PO every day    #Pulmonary embolism  Chronic, on indefinite xarelto.  -xarelto 20mg PO every day    #infectious/metabolic acidosis  Per review of urgent care labs, suspect in relation to acute abscess and stress event.  -BMP trend in AM    #hyperglycemia  Non-fasting glucose of 114 in setting of stressor event.  -BMP trend    #tobacco dependence  1ppd smoker, estimated 10pk/yr history. Previous atttempts with NRT, hesitant on chantix due to known side effects of vivid dreams. Contemplative stage of cessation. At time of admission, discussed reduction/cessation for 4 minutes.  -declines NRT at this time, to consider chantix prior to discharge         Diet: Regular Diet Adult  DVT Prophylaxis: DOAC  Aguilar Catheter: Not present  Lines: None     Cardiac Monitoring: None  Code Status: Full Code    Clinically Significant Risk Factors Present on Admission                 # Drug Induced Coagulation Defect: home medication list includes an anticoagulant medication         # Obesity: Estimated body mass index is 33.23 kg/m  as calculated from the following:    Height as of this encounter: 1.753 m (5' 9\").    Weight as of this encounter: 102.1 kg (225 lb).              Disposition Plan    "   Expected Discharge Date: 09/03/2023                  Michael Morales MD  Hospitalist Service  [facility]  Securely message with [employer] (more info)  Text page via [facility] Paging/Directory     ______________________________________________________________________    Chief Complaint   Scrotal pain, scrotal drainage    History is obtained from the patient    History of Present Illness   Ivan Nicole is a 38M presents from urgent care for scrotal abscess; pmhx includes pilonidal cyst, PE (indefinite xarelto), chronic pain (methadone maintenance); admitted to observation status for draining scrotal abscess, s/p bedside I&D with urology.    Develop left-sided scrotal pain for the past 4 days, initially dull/achy progressed to more sharp and stabbing.  Nonradiating.  Presented to urgent care for evaluation of the pain and swelling of left-sided scrotum, during examination with ultrasound felt pop sensation, and some drainage of the superficial skin was noted.  He was recommended to come to [facility] for dedicated urologic evaluation, and underwent a bedside incision and drainage while in the ED.  Postprocedural he had significant reduction in discomfort.    No fever/chills, chest pain, dyspnea, nausea/vomiting, hematuria, dysuria.      Past Medical History    Past Medical History:   Diagnosis Date    Anxiety     Chiari malformation type I (H)     Community acquired pneumonia of right middle lobe of lung     Depression        Past Surgical History   Past Surgical History:   Procedure Laterality Date    LAPAROSCOPIC CHOLECYSTECTOMY N/A 10/4/2018    Procedure: CHOLECYSTECTOMY, LAPAROSCOPIC;  Surgeon: Lj Crisostomo MD;  Location: [facility];  Service:     TONSILLECTOMY         Prior to Admission Medications   Prior to Admission Medications   Prescriptions Last Dose Informant Patient Reported? Taking?   acetaminophen (TYLENOL) 325 MG tablet Unknown at prn Self Yes Yes   Sig: Take  325-650 mg by mouth every 6 hours as needed for mild pain   methadone (DOLOPHINE) 10 mg/mL solution 9/2/2023 at am Self Yes Yes   Sig: Take 120 mg by mouth daily   omeprazole (PRILOSEC) 40 MG DR capsule 9/2/2023 at am Self Yes Yes   Sig: Take 40 mg by mouth 2 times daily   rivaroxaban ANTICOAGULANT (XARELTO) 20 MG TABS tablet 9/2/2023 at am Self Yes Yes   Sig: Take 20 mg by mouth daily with food   sertraline (ZOLOFT) 100 MG tablet 9/2/2023 at am Self Yes Yes   Sig: Take 150 mg by mouth daily Take 150 mg (1.5 tablet) by mouth daily      Facility-Administered Medications: None        Review of Systems    The 10 point Review of Systems is negative other than noted in the HPI or here.      Physical Exam   Vital Signs: Temp: 98.1  F (36.7  C) Temp src: Temporal BP: 125/58 Pulse: 76   Resp: 16 SpO2: 97 % O2 Device: None (Room air)    Weight: 225 lbs 0 oz    Constitutional: awake, alert, cooperative, no apparent distress, and appears stated age  Respiratory: CTAB  Cardiovascular: RRR no m/g/r  GI: soft, nontender, nondistended  Genitounirinary: L sided scrotum with wall induration approximately 3x6cm, small 1cm incision with packing in place, surrounding erythema. No active purulence or serosanguinous drainage  Musculoskeletal: shoulder/elbow flexion/extension 5/5 bilaterally and symmetric.  Neurologic: AOx4, no focal deficits. Sensation intact in all extremities.    Medical Decision Making       45 MINUTES SPENT BY ME on the date of service doing chart review, history, exam, documentation & further activities per the note.  MANAGEMENT DISCUSSED with the following over the past 24 hours: patient, family   NOTE(S)/MEDICAL RECORDS REVIEWED over the past 24 hours: outpatient oncology, EM visits  Tests ORDERED & REVIEWED in the past 24 hours:  - See lab/imaging results included in the data section of the note      Data     I have personally reviewed the following data over the past 24 hrs:    Procal: N/A CRP: 103.00 (H) Lactic  Acid: N/A       INR:  1.08 PTT:  29   D-dimer:  N/A Fibrinogen:  N/A       Imaging results reviewed over the past 24 hrs:   Recent Results (from the past 24 hour(s))   CT ABDOMEN PELVIS W    Narrative    For Patients: As a result of the 21st Century Cures Act, medical imaging exams and procedure reports are released immediately into your electronic medical record. You may view this report before your referring provider. If you have questions, please contact your health care provider.    EXAM: CT ABDOMEN PELVIS W  LOCATION: [facility] [place]  DATE: 9/2/2023    INDICATION: L-sided scrotal abscess, eval for signs of fourniers gangrene  COMPARISON: 09/02/2023   TECHNIQUE: CT scan of the abdomen and pelvis was performed following injection of IV contrast. Multiplanar reformats were obtained. Dose reduction techniques were used.  CONTRAST: IOPAMIDOL 300 MG/ML  ML BOTTLE: 100mL    FINDINGS:   LOWER CHEST: Mildly elevated right hemidiaphragm.     HEPATOBILIARY: Fatty change of the liver. Cholecystectomy.     PANCREAS: Normal.    SPLEEN: There is a 4.3 x 3.8 x 4.1 cm low-attenuation lesion in the upper central aspect of the spleen.     ADRENAL GLANDS: Normal.    KIDNEYS/BLADDER: Normal.    BOWEL: Normal.    LYMPH NODES: Multiple prominent lymph nodes are noted. There are 10 mm right and left external iliac lymph nodes.     VASCULATURE: Unremarkable.    PELVIC ORGANS: Skin thickening and soft tissue swelling of the scrotum . There is a 4.7 x 2.6 x 3.9 cm circumscribed low-attenuation structure in the left upper scrotal wall at the skin. No subcutaneous air.    MUSCULOSKELETAL: Normal.    Impression    1.  4.7 x 2.6 x 3.9 cm circumscribed low-attenuation structure in the left upper scrotal wall which likely represents an abscess. There is diffuse skin thickening and soft tissue swelling of the scrotum. No subcutaneous air.  2.  Indeterminate 4.3 cm splenic lesion. Incidental splenic lesions are often  benign. Recommend follow-up MRI in 6 months.  3.  Fatty change of the liver.      REFERENCE:  Managing Incidental Findings on Abdominal and Pelvic CT and MRI, Part 3: White Paper of the ACR Incidental Findings Committee II on Splenic and Stephen Findings. J Am Luis Radiol 2013; 10:833-859.   US SCROTUM WITH DUPLEX    Narrative    For Patients: As a result of the 21st Century Cures Act, medical imaging exams and procedure reports are released immediately into your electronic medical record. You may view this report before your referring provider. If you have questions, please contact your health care provider.    EXAM: US SCROTUM WITH DUPLEX  LOCATION: [facility] [place]  DATE: 9/2/2023    INDICATION: Pain/Swelling.  COMPARISON: None.  TECHNIQUE: Ultrasound of scrotum with color flow and spectral Doppler with waveform analysis performed.    FINDINGS:    RIGHT: Right testicle measures 5.0 x 2.5 x 2.2 cm. Normal testicle with no masses. Normal arterial duplex and normal color flow. Normal epididymis. No hydrocele. No varicocele.    LEFT: Left testicle measures 4.8 x 2.7 x 2.7 cm. Normal. No intratesticular masses. Normal arterial duplex and normal color flow. Normal epididymis. Tiny hydrocele. No varicocele. Lateral to the left testicle within the scrotum there is a complex heterogenous region of echogenicity with no significant internal flow on color Doppler imaging. This measures 3.3 x 1.8 x 4.4 cm. This is indeterminate, but imaging characteristics can be seen with a hematoma. Clinical and ultrasound follow-up is recommended.     Impression    1.  Possible hematoma within the lateral aspect of the left scrotum. Short-term clinical and ultrasound follow-up is recommended.    2.  Tiny left hydrocele.

## 2023-09-03 NOTE — PHARMACY
Opioid Treatment Program ([facility]one [facility]) Information Note      Treatment program name: [facility]   Location ([facility]): [place]   Phone number: [phone]            Spoke with: Shantal      Patient's current methadone dose verified as: 120 mg PO daily   Last dose was administered on: 8/30 in clinic   Take-home dose information (if applicable): Take out supply until 9/4/23      Note(s): Treatment programs in [place] dispense methadone using the 10 mg/ml oral concentrate.      Brittney Monge PharmD September 2, 2023 10:35 PM

## 2023-09-03 NOTE — CONSULTS
"[facility] CONSULTATION NOTE      Ivan GARCIA Corey   [address] [facility] [address] [address]  38 year old  male  Admission Date/Time: 9/2/2023  6:23 PM  Primary Care Provider:  Eva Iglesias was asked to see this patient by Dr. Schwartz for evaluation of left scrotal abscess.     HPI:     The patient is a 38 year old M who presented with left scrotal swelling and pain. He states he had a small area on his left priscilla-scrotum that sometimes swelling and then drains some clear fluid.     However, over the past few days this has increased in size, become pain and red so he presented for further evaluation. He denies fever, chills nausea or vomiting.     Scrotal US with skin thickening and left scrotal abscess. CT without concern for concetta's/gas. Confirmed abscess. On the way from imaging at the urgency room to the ED, he had significant drainage of pus from the area.     REVIEW OF SYSTEMS:  Per HPI      ALLERGIES/SENSITIVITIES: No Known Allergies    Current inpatient medications reviewed    PHYSICAL EXAM:     General Appearance:   Resting comfortably   /79   Pulse 86   Temp 98.1  F (36.7  C) (Temporal)   Resp 16   Ht 1.753 m (5' 9\")   Wt 102.1 kg (225 lb)   SpO2 97%   BMI 33.23 kg/m    Body mass index is 33.23 kg/m .  HEAD, EARS, NOSE, MOUTH, AND THROAT: [place]/Moist  RESPIRATORY: Normal excursion, no accessory muscles, no audible wheeze  CARDIOVASCULAR: NO JVD or visible edema  MUSCULOSKELETAL: Normal ROM observed  NEUROLOGIC: Grossly intact, symmetric fascial CN and voice.  PSYCHIATRIC: Affect appropriate, answers are clear and linear.  ABD: Soft, non-tender. No masses or rebound.  : Circumcised penis without lesions. Left hemiscrotum with skin thickening and erythema. Area on the lateral side that is indurated with draining pus. No crepitus       ADDITIONAL COMMENTS:    CT:  1.  4.7 x 2.6 x 3.9 cm circumscribed low-attenuation structure in the left upper scrotal wall which " "likely represents an abscess. There is diffuse skin thickening and soft tissue swelling of the scrotum. No subcutaneous air.   2.  Indeterminate 4.3 cm splenic lesion. Incidental splenic lesions are often benign. Recommend follow-up MRI in 6 months.   3.  Fatty change of the liver.       CONSULTATION ASSESSMENT AND PLAN:    38 year old male here with left scrotal abscess    - No systemic signs of infection. Pus draining from left hemiscrotum  - Bedside I&D performed. 4 ml of 1% lidocaine without epi injected into the area around the pustule after prepping and draping. A felipe was used to open the skin to about 2 cm. The loculations were broken up with a felipe and a culture swab was obtained. No fluctuance at the end of the procedure  - Site packed with 1/4\" iodoform gauze  - Recommend admission for IV antibiotics, follow-up culture   - Urology will continue to follow    Thank you for the kind consult and allowing me to participate in the care of your patient. Feel free to call me with any questions.    Fidel Kelley MD     "

## 2023-09-03 NOTE — ED NOTES
"[facility] ED Handoff Report    ED Chief Complaint: scrotal abscess  ED Diagnosis:  (N49.2) Cellulitis of scrotum  Comment: Urologist packed  Plan: Continue to monitor    (N49.2) Scrotum, abscess  Comment:   Plan: Continue to monitor       PMH:    Past Medical History:   Diagnosis Date    Anxiety     Chiari malformation type I (H)     Community acquired pneumonia of right middle lobe of lung     Depression         Code Status:  Full Code     Falls Risk: No Band: Not applicable    Current Living Situation/Residence: lives with a significant other     Elimination Status: Continent: Yes     Activity Level: Independent    Patients Preferred Language:  English     Needed: No    Vital Signs:  /58   Pulse 76   Temp 98.1  F (36.7  C) (Temporal)   Resp 16   Ht 1.753 m (5' 9\")   Wt 102.1 kg (225 lb)   SpO2 97%   BMI 33.23 kg/m       Cardiac Rhythm:     Pain Score: 4/10    Is the Patient Confused:  No    Last Food or Drink: 09/02/23     Focused Assessment:  skin     Tests Performed: Done: Labs    Treatments Provided:  antibiotics, abscess cultures obtained and sent to lab    Family Dynamics/Concerns: No    Family Updated On Visitor Policy: Yes    Plan of Care Communicated to Family: Yes    Who Was Updated about Plan of Care: patient and wife    Belongings Checklist Done and Signed by Patient: No    Medications sent with patient: Idoform packing strips    Covid: asymptomatic , not tested    Additional Information:     RN: Kylie Zamarripa RN   9/2/2023 9:49 PM       "

## 2023-09-04 VITALS
WEIGHT: 225 LBS | HEIGHT: 69 IN | OXYGEN SATURATION: 95 % | BODY MASS INDEX: 33.33 KG/M2 | HEART RATE: 73 BPM | SYSTOLIC BLOOD PRESSURE: 145 MMHG | RESPIRATION RATE: 18 BRPM | TEMPERATURE: 98.7 F | DIASTOLIC BLOOD PRESSURE: 78 MMHG

## 2023-09-04 PROCEDURE — G0378 HOSPITAL OBSERVATION PER HR: HCPCS

## 2023-09-04 PROCEDURE — 250N000013 HC RX MED GY IP 250 OP 250 PS 637: Performed by: STUDENT IN AN ORGANIZED HEALTH CARE EDUCATION/TRAINING PROGRAM

## 2023-09-04 PROCEDURE — 96376 TX/PRO/DX INJ SAME DRUG ADON: CPT

## 2023-09-04 PROCEDURE — 99239 HOSP IP/OBS DSCHRG MGMT >30: CPT | Performed by: INTERNAL MEDICINE

## 2023-09-04 PROCEDURE — 250N000011 HC RX IP 250 OP 636: Mod: JZ | Performed by: STUDENT IN AN ORGANIZED HEALTH CARE EDUCATION/TRAINING PROGRAM

## 2023-09-04 RX ORDER — ACETAMINOPHEN 500 MG
1000 TABLET ORAL EVERY 6 HOURS PRN
COMMUNITY
Start: 2023-09-04

## 2023-09-04 RX ORDER — SULFAMETHOXAZOLE/TRIMETHOPRIM 800-160 MG
1 TABLET ORAL 2 TIMES DAILY
Qty: 28 TABLET | Refills: 0 | Status: SHIPPED | OUTPATIENT
Start: 2023-09-04 | End: 2023-09-18

## 2023-09-04 RX ADMIN — PIPERACILLIN AND TAZOBACTAM 3.38 G: 3; .375 INJECTION, POWDER, LYOPHILIZED, FOR SOLUTION INTRAVENOUS at 04:50

## 2023-09-04 RX ADMIN — METHADONE HYDROCHLORIDE 120 MG: 10 CONCENTRATE ORAL at 09:07

## 2023-09-04 RX ADMIN — PANTOPRAZOLE SODIUM 40 MG: 40 TABLET, DELAYED RELEASE ORAL at 06:49

## 2023-09-04 ASSESSMENT — ACTIVITIES OF DAILY LIVING (ADL)
ADLS_ACUITY_SCORE: 31

## 2023-09-04 NOTE — PROGRESS NOTES
Care Management Discharge Note    Discharge Date: 09/04/2023       Discharge Disposition:  Home      Additional Information:  Discharge anticipated. No CM needs identified.    SHYAM Olivier

## 2023-09-04 NOTE — DISCHARGE SUMMARY
[facility]  Hospitalist Discharge Summary      Date of Admission:  9/2/2023  Date of Discharge:  9/4/2023 12:14 PM  Discharging Provider: Perry Saeed DO  Discharge Service: Hospitalist Service        Follow-ups Needed After Discharge   Follow-up Appointments     Follow-up and recommended labs and tests       Follow up with urology as directed            Unresulted Labs Ordered in the Past 30 Days of this Admission       Date and Time Order Name Status Description    9/2/2023  8:25 PM Abscess Aerobic Bacterial Culture Routine Preliminary     9/2/2023  8:25 PM Anaerobic Bacterial Culture Routine Preliminary         These results will be followed up by Hospitalist results pool    Discharge Disposition   Discharged to home  Condition at discharge: Stable      Hospital Course   38-year-old male with history of chronic pain, PE and tobacco dependence presents with scrotal abscess.        Scrotal abscess:  Underwent bedside I&D 9/2/2023  --Follow-up wound cultures that are pending at time of discharge  -- changed from IV Zosyn to bactrim at time of discharge per urology recommendations  -- outpatient follow up with urology as directed       Chronic pain:  --Continue home methadone        History of PE: Continue home Xarelto        GERD: Continue home PPI        Mood disorder: Continue home Zoloft        Consultations This Hospital Stay   None    Code Status   Full Code    Time Spent on this Encounter   IPerry DO, personally saw the patient today and spent greater than 30 minutes discharging this patient.       DO JENNIFER Campbell [address] [address]  Phone: [phone]  Fax: [phone]  ______________________________________________________________________    Physical Exam   Vital Signs: Temp: 98.7  F (37.1  C) Temp src: Oral BP: (!) 145/78 Pulse: 73   Resp: 18 SpO2: 95 % O2 Device: None (Room air)     Weight: 225 lbs 0 oz    General: NAD  HEENT: Without congestion or inflammation  RESPIRATORY: Respirations nonlabored  CARDIOVASCULAR: No le edema bilat.  NEUROLOGIC: No focal arm or leg weakness, speech is clear    Primary Care Physician   Eva Iglesias    Discharge Orders      Reason for your hospital stay    Abscess     Follow-up and recommended labs and tests     Follow up with urology as directed     Activity    Your activity upon discharge: activity as tolerated     Diet    Follow this diet upon discharge: Orders Placed This Encounter      Regular Diet Adult     \    Discharge Medications   Current Discharge Medication List        START taking these medications    Details   sulfamethoxazole-trimethoprim (BACTRIM DS) 800-160 MG tablet Take 1 tablet by mouth 2 times daily for 14 days  Qty: 28 tablet, Refills: 0    Associated Diagnoses: Cellulitis of scrotum           CONTINUE these medications which have CHANGED    Details   acetaminophen (TYLENOL) 500 MG tablet Take 2 tablets (1,000 mg) by mouth every 6 hours as needed for mild pain, fever or headaches    Associated Diagnoses: Cellulitis of scrotum           CONTINUE these medications which have NOT CHANGED    Details   methadone (DOLOPHINE) 10 mg/mL solution Take 120 mg by mouth daily      omeprazole (PRILOSEC) 40 MG DR capsule Take 40 mg by mouth 2 times daily      rivaroxaban ANTICOAGULANT (XARELTO) 20 MG TABS tablet Take 20 mg by mouth daily with food      sertraline (ZOLOFT) 100 MG tablet Take 150 mg by mouth daily Take 150 mg (1.5 tablet) by mouth daily           Allergies   No Known Allergies

## 2023-09-04 NOTE — PLAN OF CARE
PRIMARY DIAGNOSIS: SOFT TISSUE INFECTIONS  OUTPATIENT/OBSERVATION GOALS TO BE MET BEFORE DISCHARGE:  Vitals sign stable or return to baseline: Yes    Tolerating oral antibiotics or has home infusion set up if applicable: Yes    Pain status: Improved-controlled with oral pain medications.    Return to near baseline physical activity: Yes    Discharge Planner Nurse   Safe discharge environment identified: Yes  Barriers to discharge: No       Entered by: Tonya Rome RN 09/04/2023 11:58 AM     Please review provider order for any additional goals.     Nurse to notify provider when observation goals have been met and patient is ready for discharge.Goal Outcome Evaluation:    Patient has a scrotal abscess, dressing completed. Wife here and observed dressing change. Patient did have much discomfort from dressing change but declined pain medications. Discharging to home on oral antibiotics.

## 2023-09-04 NOTE — PLAN OF CARE
Problem: Plan of Care - These are the overarching goals to be used throughout the patient stay.    Goal: Optimal Comfort and Wellbeing  Outcome: Progressing     Pt comfortable overnight, no pain reported.   IV antibiotics received.   Dressing change on groin site 2x daily performed.   Pt is hopeful to discharge today if possible.   A/O 4x, makes needs known.

## 2023-09-04 NOTE — PROGRESS NOTES
"  Place of Service:  [facility]     Reason for follow up: Scrotal abscess    SUBJECTIVE:  Events: no acute events overnight    Patient feels well today. Continues to improve. Denies fevers/chills. No N/V. Scrotal pain much improved. Nursing has been exchanging packing BID.     OBJECTIVE:  PHYSICAL EXAM:  Temp: 98.7  F (37.1  C) Temp src: Oral BP: (!) 145/78 Pulse: 73   Resp: 18 SpO2: 95 % O2 Device: None (Room air)    General: NAD, alert, cooperative  Head: normocephalic, without abnormality / atraumatic  Abdomen: soft, non tender, non distended. No suprapubic fullness, no suprapubic tenderness. No CVA tenderness,   Genitourinary: Circumcised penis without lesions. Left hemiscrotum with skin thickening, minimal erythema. Small incised area open with 1/4\" iodoform packing in place. No bleeding or notable drainage. No fluctuance or crepitus.    Skin: No rashes or lesions  Musculoskeletal: moves all four extremities equally; no calf edema or tenderness  Psychological: alert and oriented, answers questions appropriately    LABS:  Creatinine   Date Value Ref Range Status   09/03/2023 0.77 0.67 - 1.17 mg/dL Final     WBC Count   Date Value Ref Range Status   09/03/2023 8.2 4.0 - 11.0 10e3/uL Final     Hemoglobin   Date Value Ref Range Status   09/03/2023 13.9 13.3 - 17.7 g/dL Final   ]  Platelet Count   Date Value Ref Range Status   09/03/2023 151 150 - 450 10e3/uL Final         Cultures:    Aerobic/anaerobic wound cultures: NGTD    Lab Results: personally reviewed.     ASSESSMENT/PLAN:  Ivan Nicole is being seen by [facility] for a left scrotal abscess.     - S/p bedside I&D by Dr. Kelley 9/2.  - Wound packed with 1/4\" iodoform gauze. Patient to change at home BID, which he is comfortable with. Make sure to leave a long tail so the gauze does not get enclosed in scrotal wound.  - Afebrile, WBC normal. Patient much improved clinically. Exam also much improved.  - Patient has been afebrile > 24 hrs. " Wound cultures with NGTD. Okay to discharge today from a urology standpoint.   - Recommend Bactrim at discharge. Will contact patient to arrange follow up in our office in 2 weeks.   - Patient to return to the ER for fever, worsening scrotal swelling or pain.    Discussed patient with Dr. Fidel Kelley.      Aster Lorenzana PA-C  [facility]   [phone]

## 2023-09-06 LAB
BACTERIA ABSC ANAEROBE+AEROBE CULT: ABNORMAL
BACTERIA ABSC ANAEROBE+AEROBE CULT: NORMAL

## 2024-09-07 ENCOUNTER — HEALTH MAINTENANCE LETTER (OUTPATIENT)
Age: 39
End: 2024-09-07

## 2025-05-28 ENCOUNTER — HOSPITAL ENCOUNTER (EMERGENCY)
Facility: HOSPITAL | Age: 40
Discharge: HOME | End: 2025-05-28
Attending: EMERGENCY MEDICINE
Payer: COMMERCIAL

## 2025-05-28 ENCOUNTER — APPOINTMENT (OUTPATIENT)
Dept: CT IMAGING | Facility: HOSPITAL | Age: 40
End: 2025-05-28
Payer: COMMERCIAL

## 2025-05-28 VITALS
DIASTOLIC BLOOD PRESSURE: 82 MMHG | SYSTOLIC BLOOD PRESSURE: 179 MMHG | OXYGEN SATURATION: 92 % | RESPIRATION RATE: 13 BRPM | HEART RATE: 67 BPM | WEIGHT: 195 LBS | BODY MASS INDEX: 27.92 KG/M2 | HEIGHT: 70 IN | TEMPERATURE: 98.3 F

## 2025-05-28 DIAGNOSIS — R07.81 RIB PAIN ON RIGHT SIDE: ICD-10-CM

## 2025-05-28 DIAGNOSIS — R11.0 NAUSEA: ICD-10-CM

## 2025-05-28 LAB
ALBUMIN SERPL BCG-MCNC: 4.4 G/DL (ref 3.5–5.2)
ALBUMIN UR-MCNC: NEGATIVE MG/DL
ALP SERPL-CCNC: 106 U/L (ref 40–150)
ALT SERPL W P-5'-P-CCNC: 21 U/L (ref 0–70)
ANION GAP SERPL CALCULATED.3IONS-SCNC: 12 MMOL/L (ref 7–15)
APPEARANCE UR: CLEAR
AST SERPL W P-5'-P-CCNC: 13 U/L (ref 0–45)
ATRIAL RATE - MUSE: 68 BPM
BASOPHILS # BLD AUTO: 0.1 10E3/UL (ref 0–0.2)
BASOPHILS NFR BLD AUTO: 1 %
BILIRUB SERPL-MCNC: 0.3 MG/DL
BILIRUB UR QL STRIP: NEGATIVE
BUN SERPL-MCNC: 9.8 MG/DL (ref 6–20)
CALCIUM SERPL-MCNC: 9.6 MG/DL (ref 8.8–10.4)
CHLORIDE SERPL-SCNC: 98 MMOL/L (ref 98–107)
COLOR UR AUTO: ABNORMAL
CREAT SERPL-MCNC: 0.67 MG/DL (ref 0.67–1.17)
D DIMER PPP FEU-MCNC: 0.46 UG/ML FEU (ref 0–0.5)
DIASTOLIC BLOOD PRESSURE - MUSE: NORMAL MMHG
EGFRCR SERPLBLD CKD-EPI 2021: >90 ML/MIN/1.73M2
EOSINOPHIL # BLD AUTO: 0 10E3/UL (ref 0–0.7)
EOSINOPHIL NFR BLD AUTO: 0 %
ERYTHROCYTE [DISTWIDTH] IN BLOOD BY AUTOMATED COUNT: 13 % (ref 10–15)
GLUCOSE SERPL-MCNC: 102 MG/DL (ref 70–99)
GLUCOSE UR STRIP-MCNC: NEGATIVE MG/DL
HCO3 SERPL-SCNC: 33 MMOL/L (ref 22–29)
HCT VFR BLD AUTO: 47.2 % (ref 40–53)
HGB BLD-MCNC: 15.6 G/DL (ref 13.3–17.7)
HGB UR QL STRIP: NEGATIVE
HOLD SPECIMEN: NORMAL
IMM GRANULOCYTES # BLD: 0.1 10E3/UL
IMM GRANULOCYTES NFR BLD: 1 %
INTERPRETATION ECG - MUSE: NORMAL
KETONES UR STRIP-MCNC: NEGATIVE MG/DL
LEUKOCYTE ESTERASE UR QL STRIP: NEGATIVE
LIPASE SERPL-CCNC: 15 U/L (ref 13–60)
LYMPHOCYTES # BLD AUTO: 3 10E3/UL (ref 0.8–5.3)
LYMPHOCYTES NFR BLD AUTO: 22 %
MAGNESIUM SERPL-MCNC: 2 MG/DL (ref 1.7–2.3)
MCH RBC QN AUTO: 30.3 PG (ref 26.5–33)
MCHC RBC AUTO-ENTMCNC: 33.1 G/DL (ref 31.5–36.5)
MCV RBC AUTO: 92 FL (ref 78–100)
MONOCYTES # BLD AUTO: 0.7 10E3/UL (ref 0–1.3)
MONOCYTES NFR BLD AUTO: 5 %
MUCOUS THREADS #/AREA URNS LPF: PRESENT /LPF
NEUTROPHILS # BLD AUTO: 10.1 10E3/UL (ref 1.6–8.3)
NEUTROPHILS NFR BLD AUTO: 72 %
NITRATE UR QL: NEGATIVE
NRBC # BLD AUTO: 0 10E3/UL
NRBC BLD AUTO-RTO: 0 /100
NT-PROBNP SERPL-MCNC: 79 PG/ML (ref 0–93)
P AXIS - MUSE: 52 DEGREES
PH UR STRIP: 7 [PH] (ref 5–7)
PLATELET # BLD AUTO: 204 10E3/UL (ref 150–450)
POTASSIUM SERPL-SCNC: 3.2 MMOL/L (ref 3.4–5.3)
PR INTERVAL - MUSE: 150 MS
PROT SERPL-MCNC: 7.9 G/DL (ref 6.4–8.3)
QRS DURATION - MUSE: 100 MS
QT - MUSE: 436 MS
QTC - MUSE: 463 MS
R AXIS - MUSE: 65 DEGREES
RBC # BLD AUTO: 5.15 10E6/UL (ref 4.4–5.9)
RBC URINE: <1 /HPF
SODIUM SERPL-SCNC: 143 MMOL/L (ref 135–145)
SP GR UR STRIP: 1.02 (ref 1–1.03)
SQUAMOUS EPITHELIAL: <1 /HPF
SYSTOLIC BLOOD PRESSURE - MUSE: NORMAL MMHG
T AXIS - MUSE: 49 DEGREES
TROPONIN T SERPL HS-MCNC: <6 NG/L
UROBILINOGEN UR STRIP-MCNC: NORMAL MG/DL
VENTRICULAR RATE- MUSE: 68 BPM
WBC # BLD AUTO: 14.1 10E3/UL (ref 4–11)
WBC URINE: 2 /HPF

## 2025-05-28 PROCEDURE — 84484 ASSAY OF TROPONIN QUANT: CPT

## 2025-05-28 PROCEDURE — 93005 ELECTROCARDIOGRAM TRACING: CPT | Performed by: EMERGENCY MEDICINE

## 2025-05-28 PROCEDURE — 85018 HEMOGLOBIN: CPT | Performed by: STUDENT IN AN ORGANIZED HEALTH CARE EDUCATION/TRAINING PROGRAM

## 2025-05-28 PROCEDURE — 83880 ASSAY OF NATRIURETIC PEPTIDE: CPT

## 2025-05-28 PROCEDURE — 250N000011 HC RX IP 250 OP 636

## 2025-05-28 PROCEDURE — 85379 FIBRIN DEGRADATION QUANT: CPT

## 2025-05-28 PROCEDURE — 83735 ASSAY OF MAGNESIUM: CPT

## 2025-05-28 PROCEDURE — 80053 COMPREHEN METABOLIC PANEL: CPT | Performed by: EMERGENCY MEDICINE

## 2025-05-28 PROCEDURE — 74177 CT ABD & PELVIS W/CONTRAST: CPT

## 2025-05-28 PROCEDURE — 36415 COLL VENOUS BLD VENIPUNCTURE: CPT | Performed by: STUDENT IN AN ORGANIZED HEALTH CARE EDUCATION/TRAINING PROGRAM

## 2025-05-28 PROCEDURE — 83690 ASSAY OF LIPASE: CPT

## 2025-05-28 PROCEDURE — 81001 URINALYSIS AUTO W/SCOPE: CPT

## 2025-05-28 PROCEDURE — 93005 ELECTROCARDIOGRAM TRACING: CPT | Performed by: STUDENT IN AN ORGANIZED HEALTH CARE EDUCATION/TRAINING PROGRAM

## 2025-05-28 PROCEDURE — 80053 COMPREHEN METABOLIC PANEL: CPT | Performed by: STUDENT IN AN ORGANIZED HEALTH CARE EDUCATION/TRAINING PROGRAM

## 2025-05-28 PROCEDURE — 99285 EMERGENCY DEPT VISIT HI MDM: CPT | Mod: 25

## 2025-05-28 PROCEDURE — 250N000013 HC RX MED GY IP 250 OP 250 PS 637

## 2025-05-28 PROCEDURE — 85025 COMPLETE CBC W/AUTO DIFF WBC: CPT | Performed by: EMERGENCY MEDICINE

## 2025-05-28 RX ORDER — POTASSIUM CHLORIDE 1.5 G/1.58G
20 POWDER, FOR SOLUTION ORAL ONCE
Status: COMPLETED | OUTPATIENT
Start: 2025-05-28 | End: 2025-05-28

## 2025-05-28 RX ORDER — IOPAMIDOL 755 MG/ML
90 INJECTION, SOLUTION INTRAVASCULAR ONCE
Status: COMPLETED | OUTPATIENT
Start: 2025-05-28 | End: 2025-05-28

## 2025-05-28 RX ADMIN — IOPAMIDOL 90 ML: 755 INJECTION, SOLUTION INTRAVENOUS at 17:54

## 2025-05-28 RX ADMIN — POTASSIUM CHLORIDE 20 MEQ: 1.5 POWDER, FOR SOLUTION ORAL at 18:03

## 2025-05-28 ASSESSMENT — ACTIVITIES OF DAILY LIVING (ADL)
ADLS_ACUITY_SCORE: 44

## 2025-05-28 ASSESSMENT — COLUMBIA-SUICIDE SEVERITY RATING SCALE - C-SSRS
6. HAVE YOU EVER DONE ANYTHING, STARTED TO DO ANYTHING, OR PREPARED TO DO ANYTHING TO END YOUR LIFE?: NO
1. IN THE PAST MONTH, HAVE YOU WISHED YOU WERE DEAD OR WISHED YOU COULD GO TO SLEEP AND NOT WAKE UP?: NO
2. HAVE YOU ACTUALLY HAD ANY THOUGHTS OF KILLING YOURSELF IN THE PAST MONTH?: NO

## 2025-05-28 NOTE — ED PROVIDER NOTES
"Emergency Department Encounter   NAME: Ivan Nicole ; AGE: 40 year old male ; YOB: 1985 ; MRN: [id number] ; PCP: Eva Iglesias   ED PROVIDER: Beatrice Paredes PA-C    Evaluation Date & Time:   5/28/2025  2:59 PM    CHIEF COMPLAINT:  Abdominal Pain      Impression and Plan   MDM: Ivan Nicole is a 40 year old male who presents to the ED for evaluation of ***.     Seen on 5/26 at urgency room: At the time was having diarrhea.  They were unable to get a line on him so did not do any workup.  Discharged him home with Zofran.  Patient reports that since then he has not had any diarrhea or vomiting.  He has developed a persistent right sided rib pain since being home.  At times feels short of breath and this feels sporadic.  Not necessarily worse with movement.  Denies any chest pain.  Had a history of a PE about 5 years ago and has been on Xarelto since is a never found underlying cause.  No other medical conditions.  Patient does smoke cigarettes daily however does not have any lung diseases.    Reports since being home on the 26th has just \"felt sick.\"  States the diarrhea has stopped now.  No nausea or vomiting.  Reports rib pain on the right side.  Subjective fevers.  No chest pain.   Takes methadone.  Has been taking this like normal daily.  Does not feel concerned about withdrawal.              Medical Decision Making  {DID YOU REMEMBER TO DOCUMENT...?:612204}  {ADMIT VS D/C:597837}    MIPS (CTPE, Dental pain, Aguilar, Sinusitis, Asthma/COPD, Head Trauma): {[facility] DOCUMENTATION:394088}    SEPSIS: {Sepsis/Stemi/Stroke:236737::\"None\"}        Critical Care:     ED COURSE:  3:04 PM I met and introduced myself to the patient. I gathered initial history and performed my physical exam. We discussed plan for initial workup.   *** I have staffed the patient with  ***, ED MD, who has evaluated the patient and agrees with all aspects of today's care.   *** I rechecked the patient and discussed " "results, discharge, follow up, and reasons to return to the ED.     At the conclusion of the encounter I discussed the results of all the tests and the disposition. The questions were answered. The patient or family acknowledged understanding and was agreeable with the care plan.    FINAL IMPRESSION:  No diagnosis found.      MEDICATIONS GIVEN IN THE EMERGENCY DEPARTMENT:  Medications - No data to display      NEW PRESCRIPTIONS STARTED AT TODAY'S ED VISIT:  New Prescriptions    No medications on file         HPI   Use of Intrepreter: N/A ***    Ivan Nicole is a 40 year old male with a pertinent history of *** who presents to the ED by *** for evaluation of ***.         Medical History     Past Medical History:   Diagnosis Date    Anxiety     Chiari malformation type I (H)     Community acquired pneumonia of right middle lobe of lung     Depression        Past Surgical History:   Procedure Laterality Date    LAPAROSCOPIC CHOLECYSTECTOMY N/A 10/4/2018    Procedure: CHOLECYSTECTOMY, LAPAROSCOPIC;  Surgeon: Lj Crisostomo MD;  Location: [facility];  Service:     TONSILLECTOMY         Family History   Problem Relation Age of Onset    Snoring Brother     Snoring Brother        Social History     Tobacco Use    Smoking status: Every Day     Current packs/day: 1.00     Types: Cigarettes    Smokeless tobacco: Current   Substance Use Topics    Alcohol use: No    Drug use: Yes     Comment: 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       acetaminophen (TYLENOL) 500 MG tablet  methadone (DOLOPHINE) 10 mg/mL solution  omeprazole (PRILOSEC) 40 MG DR capsule  rivaroxaban ANTICOAGULANT (XARELTO) 20 MG TABS tablet  sertraline (ZOLOFT) 100 MG tablet          Physical Exam     First Vitals:  Patient Vitals for the past 24 hrs:   BP Temp Temp src Pulse Resp SpO2 Height Weight   05/28/25 1453 (!) 170/108 98.3  F (36.8  C) Oral 90 18 100 % 1.778 m (5' 10\") 88.5 kg (195 lb)         PHYSICAL EXAM: "   Physical Exam    Constitutional: alert, *** no acute distress, *** not ill-appearing  Head: normocephalic, atraumatic  Eyes: EOM intact   Neck: ROM normal  Cardio: regular rate  Pulmonary: effort normal   Abdominal: flat, no distention  MSK: no obvious swelling or deformity  Skin: no visible rashes or erythema   Neuro: no gross focal deficit, acting per baseline   Psychiatric: mood and behavior within normal limit    Results     LAB:  All pertinent labs reviewed and interpreted  Labs Ordered and Resulted from Time of ED Arrival to Time of ED Departure - No data to display     RADIOLOGY:  No orders to display       EKG results reviewed and interpreted by  ***, ED MD.     PROCEDURES:  ***      I, ***, am serving as a scribe to document services personally performed by Beatrice Paredes PA-C, based on my observation and the provider's statements to me. I, Beatrice Paredes PA-C attest that *** is acting in a scribe capacity, has observed my performance of the services and has documented them in accordance with my direction.       Beatrice Paredes PA-C   Emergency Medicine   [facility] EMERGENCY DEPARTMENT     tablet  sertraline (ZOLOFT) 100 MG tablet          Physical Exam     First Vitals:  No data found.        PHYSICAL EXAM:   Physical Exam    Constitutional: alert,  no acute distress, diaphoretic  Head: normocephalic, atraumatic  Eyes: EOM intact   Neck: ROM normal  Cardio: regular rate, regular rhythm, no murmurs   Pulmonary: effort normal, lung sounds normal, no wheezing, crackles, or rales    Abdominal: flat, no distention, nontender, minimal right sided rib pain and RUQ reproducible to palpation  MSK: no obvious swelling or deformity  Skin: no visible rashes or erythema   Neuro: no gross focal deficit, acting per baseline   Psychiatric: mood and behavior within normal limit    Results     LAB:  All pertinent labs reviewed and interpreted  Labs Ordered and Resulted from Time of ED Arrival to Time of ED Departure   COMPREHENSIVE METABOLIC PANEL - Abnormal       Result Value    Sodium 143      Potassium 3.2 (*)     Carbon Dioxide (CO2) 33 (*)     Anion Gap 12      Urea Nitrogen 9.8      Creatinine 0.67      GFR Estimate >90      Calcium 9.6      Chloride 98      Glucose 102 (*)     Alkaline Phosphatase 106      AST 13      ALT 21      Protein Total 7.9      Albumin 4.4      Bilirubin Total 0.3     CBC WITH PLATELETS AND DIFFERENTIAL - Abnormal    WBC Count 14.1 (*)     RBC Count 5.15      Hemoglobin 15.6      Hematocrit 47.2      MCV 92      MCH 30.3      MCHC 33.1      RDW 13.0      Platelet Count 204      % Neutrophils 72      % Lymphocytes 22      % Monocytes 5      % Eosinophils 0      % Basophils 1      % Immature Granulocytes 1      NRBCs per 100 WBC 0      Absolute Neutrophils 10.1 (*)     Absolute Lymphocytes 3.0      Absolute Monocytes 0.7      Absolute Eosinophils 0.0      Absolute Basophils 0.1      Absolute Immature Granulocytes 0.1      Absolute NRBCs 0.0     ROUTINE UA WITH MICROSCOPIC REFLEX TO CULTURE - Abnormal    Color Urine Light Yellow      Appearance Urine Clear      Glucose Urine Negative       Bilirubin Urine Negative      Ketones Urine Negative      Specific Gravity Urine 1.019      Blood Urine Negative      pH Urine 7.0      Protein Albumin Urine Negative      Urobilinogen Urine Normal      Nitrite Urine Negative      Leukocyte Esterase Urine Negative      Mucus Urine Present (*)     RBC Urine <1      WBC Urine 2      Squamous Epithelials Urine <1     MAGNESIUM - Normal    Magnesium 2.0     LIPASE - Normal    Lipase 15     D DIMER QUANTITATIVE - Normal    D-Dimer Quantitative 0.46     TROPONIN T, HIGH SENSITIVITY - Normal    Troponin T, High Sensitivity <6     NT-PROBNP - Normal    NT-proBNP 79          RADIOLOGY:  CT Abdomen Pelvis w Contrast   Final Result   IMPRESSION:    1.  No abnormality seen to explain symptoms.   2.  Specifically, no inflammatory changes or evidence of an enteritis or colitis.   3.  Prior cholecystectomy.   4.  Stable 3.7 cm hypodense lesion in the spleen, presumed hemangioma.        EKG results reviewed and interpreted by Dr. Cayetano ED MD.     PROCEDURES:  None     Beatrice Paredes PA-C   Emergency Medicine   [facility] EMERGENCY DEPARTMENT       Beatrice Paredes PA-C  05/30/25 1529

## 2025-05-28 NOTE — ED TRIAGE NOTES
Pt on Xarelto for hx of blood clots.   Pt reports diarrhea last week which has now resolved.  Pt now having Lt lateral abd. Pain  and nausea, very similar to when he prev. Had blood clots.        Triage Assessment (Adult)       Row Name 05/28/25 8427          Triage Assessment    Airway WDL WDL        Respiratory WDL    Respiratory WDL rhythm/pattern     Rhythm/Pattern, Respiratory shortness of breath  intermittent        Skin Circulation/Temperature WDL    Skin Circulation/Temperature WDL WDL        Cardiac WDL    Cardiac WDL WDL        Peripheral/Neurovascular WDL    Peripheral Neurovascular WDL WDL        Cognitive/Neuro/Behavioral WDL    Cognitive/Neuro/Behavioral WDL WDL

## 2025-05-28 NOTE — ED PROVIDER NOTES
"I, Yahaira Monteiro have reviewed the documentation, personally taken the patient's history, performed an exam and agree with the physical finds, diagnosis and management plan.    HPI:  41 y/o M here w R sided rib pain. Seen at  on 26th for a week of diarrhea. Couldn't get IV access, so didn't get any labs. Diarrhea resolved, 5 days ago and had more of a normal bowel movement today. Now R sided rib pain, no sob. Maybe occ RUQ abd pain. Hx of PE 5yrs ago on xarelto, compliant w same. On methadone, complaint.  Prev cholecystectomy.  Declines analgesia - states \"discomfort\".  Patient is primarily concerned about repeat PE.  Notes that he is just had anorexia for the last 5 days has not really ate much.    Physical Exam: On examination patient is well-appearing.  Regular rate, rhythm.  Minimally hypertensive.  Lungs are clear.  I am not able to reproduce any pain, chest wall, CVA or abdomen.    MDM: My concern for breakthrough PE is low.  Recent diarrheal illness concern for an enteritis, colitis, less likely pancreatitis, hepatobiliary pathology.    ED Course/workup: Laboratory workup notable only for mild leukocytosis.  CT abdomen pelvis benign.  Safe for discharge home.             Final Diagnosis:     ICD-10-CM    1. Rib pain on right side  R07.81       2. Nausea  R11.0             I personally saw the patient and performed a substantive portion of the visit including all aspects of the medical decision making.  I personally made/approved the management plan and take responsibility for the patient management.     MD Cayetano Fisher Zabrina N, MD  05/28/25 1918    "

## 2025-05-29 NOTE — DISCHARGE INSTRUCTIONS
No clear cause of your rib discomfort. Follow up with your primary for ongoing symptoms or come back here for acute chest pain, shortness of breath.

## 2025-07-23 ENCOUNTER — LAB REQUISITION (OUTPATIENT)
Dept: LAB | Facility: CLINIC | Age: 40
End: 2025-07-23

## 2025-07-23 DIAGNOSIS — R53.83 OTHER FATIGUE: ICD-10-CM

## 2025-07-23 DIAGNOSIS — R63.4 ABNORMAL WEIGHT LOSS: ICD-10-CM

## 2025-07-23 DIAGNOSIS — F19.21 OTHER PSYCHOACTIVE SUBSTANCE DEPENDENCE, IN REMISSION (H): ICD-10-CM

## 2025-07-23 LAB
ALBUMIN SERPL BCG-MCNC: 4.4 G/DL (ref 3.5–5.2)
ALP SERPL-CCNC: 104 U/L (ref 40–150)
ALT SERPL W P-5'-P-CCNC: 23 U/L (ref 0–70)
ANION GAP SERPL CALCULATED.3IONS-SCNC: 11 MMOL/L (ref 7–15)
AST SERPL W P-5'-P-CCNC: 14 U/L (ref 0–45)
BILIRUB SERPL-MCNC: 0.4 MG/DL
BUN SERPL-MCNC: 8.8 MG/DL (ref 6–20)
CALCIUM SERPL-MCNC: 10 MG/DL (ref 8.8–10.4)
CHLORIDE SERPL-SCNC: 98 MMOL/L (ref 98–107)
CREAT SERPL-MCNC: 0.74 MG/DL (ref 0.67–1.17)
CRP SERPL HS-MCNC: 4.88 MG/L
EGFRCR SERPLBLD CKD-EPI 2021: >90 ML/MIN/1.73M2
ERYTHROCYTE [DISTWIDTH] IN BLOOD BY AUTOMATED COUNT: 13.1 % (ref 10–15)
ERYTHROCYTE [SEDIMENTATION RATE] IN BLOOD BY WESTERGREN METHOD: 9 MM/HR (ref 0–15)
EST. AVERAGE GLUCOSE BLD GHB EST-MCNC: 117 MG/DL
GLUCOSE SERPL-MCNC: 117 MG/DL (ref 70–99)
HAV IGM SERPL QL IA: NONREACTIVE
HBA1C MFR BLD: 5.7 %
HBV CORE IGM SERPL QL IA: NONREACTIVE
HBV SURFACE AG SERPL QL IA: NONREACTIVE
HCO3 SERPL-SCNC: 32 MMOL/L (ref 22–29)
HCT VFR BLD AUTO: 45.3 % (ref 40–53)
HCV AB SERPL QL IA: NONREACTIVE
HGB BLD-MCNC: 15.5 G/DL (ref 13.3–17.7)
HIV 1+2 AB+HIV1 P24 AG SERPL QL IA: NONREACTIVE
MCH RBC QN AUTO: 31.1 PG (ref 26.5–33)
MCHC RBC AUTO-ENTMCNC: 34.2 G/DL (ref 31.5–36.5)
MCV RBC AUTO: 91 FL (ref 78–100)
PLATELET # BLD AUTO: 178 10E3/UL (ref 150–450)
POTASSIUM SERPL-SCNC: 4.1 MMOL/L (ref 3.4–5.3)
PROT SERPL-MCNC: 7.6 G/DL (ref 6.4–8.3)
RBC # BLD AUTO: 4.99 10E6/UL (ref 4.4–5.9)
SODIUM SERPL-SCNC: 141 MMOL/L (ref 135–145)
TSH SERPL DL<=0.005 MIU/L-ACNC: 0.72 UIU/ML (ref 0.3–4.2)
WBC # BLD AUTO: 9.4 10E3/UL (ref 4–11)

## 2025-07-23 PROCEDURE — 85018 HEMOGLOBIN: CPT | Performed by: PHYSICIAN ASSISTANT

## 2025-07-23 PROCEDURE — 84443 ASSAY THYROID STIM HORMONE: CPT | Performed by: PHYSICIAN ASSISTANT

## 2025-07-23 PROCEDURE — 83036 HEMOGLOBIN GLYCOSYLATED A1C: CPT | Performed by: PHYSICIAN ASSISTANT

## 2025-07-23 PROCEDURE — 85652 RBC SED RATE AUTOMATED: CPT | Performed by: PHYSICIAN ASSISTANT

## 2025-07-23 PROCEDURE — 86618 LYME DISEASE ANTIBODY: CPT | Performed by: PHYSICIAN ASSISTANT

## 2025-07-23 PROCEDURE — 86705 HEP B CORE ANTIBODY IGM: CPT | Performed by: PHYSICIAN ASSISTANT

## 2025-07-23 PROCEDURE — 85730 THROMBOPLASTIN TIME PARTIAL: CPT | Performed by: PHYSICIAN ASSISTANT

## 2025-07-23 PROCEDURE — 87389 HIV-1 AG W/HIV-1&-2 AB AG IA: CPT | Performed by: PHYSICIAN ASSISTANT

## 2025-07-23 PROCEDURE — 82310 ASSAY OF CALCIUM: CPT | Performed by: PHYSICIAN ASSISTANT

## 2025-07-23 PROCEDURE — 86141 C-REACTIVE PROTEIN HS: CPT | Performed by: PHYSICIAN ASSISTANT

## 2025-07-23 PROCEDURE — 85390 FIBRINOLYSINS SCREEN I&R: CPT | Performed by: PATHOLOGY

## 2025-07-23 PROCEDURE — 86038 ANTINUCLEAR ANTIBODIES: CPT | Performed by: PHYSICIAN ASSISTANT

## 2025-07-24 LAB
ANA SER QL IF: NEGATIVE
B BURGDOR IGG+IGM SER QL: 0.38
LABORATORY COMMENT REPORT: NEGATIVE
LABORATORY COMMENT REPORT: NORMAL
SCREEN APTT/NORMAL: 1.09
SCREEN DRVVT/NORMAL: 0.91 %